# Patient Record
Sex: FEMALE | Race: BLACK OR AFRICAN AMERICAN | NOT HISPANIC OR LATINO | Employment: PART TIME | URBAN - METROPOLITAN AREA
[De-identification: names, ages, dates, MRNs, and addresses within clinical notes are randomized per-mention and may not be internally consistent; named-entity substitution may affect disease eponyms.]

---

## 2023-09-29 ENCOUNTER — LAB (OUTPATIENT)
Dept: LAB | Facility: LAB | Age: 23
End: 2023-09-29
Payer: COMMERCIAL

## 2023-09-29 LAB
CHLAMYDIA TRACH., AMPLIFIED: NEGATIVE
HEPATITIS B VIRUS SURFACE AG PRESENCE IN SERUM: NONREACTIVE
HEPATITIS C VIRUS AB PRESENCE IN SERUM: NONREACTIVE
HIV 1/ 2 AG/AB SCREEN: NONREACTIVE
N. GONORRHEA, AMPLIFIED: NEGATIVE
SYPHILIS TOTAL AB: NONREACTIVE
TRICHOMONAS VAGINALIS: NEGATIVE

## 2023-10-02 DIAGNOSIS — B96.89 BACTERIAL VAGINOSIS: Primary | ICD-10-CM

## 2023-10-02 DIAGNOSIS — N76.0 BACTERIAL VAGINOSIS: Primary | ICD-10-CM

## 2023-10-02 LAB
CLUE CELLS: PRESENT
NUGENT SCORE: 7
YEAST: ABNORMAL

## 2023-10-04 ENCOUNTER — TELEPHONE (OUTPATIENT)
Dept: OBSTETRICS AND GYNECOLOGY | Facility: HOSPITAL | Age: 23
End: 2023-10-04
Payer: COMMERCIAL

## 2023-10-04 RX ORDER — METRONIDAZOLE 500 MG/1
500 TABLET ORAL 2 TIMES DAILY
Qty: 14 TABLET | Refills: 0 | Status: SHIPPED | OUTPATIENT
Start: 2023-10-04 | End: 2023-10-11

## 2023-10-04 NOTE — PROGRESS NOTES
RICH score 7    Positive for Clue Cells.    Metronidazole 500mg twice a day for 7 days sent to pharmacy.    PHAN Carey-ANGELA

## 2024-02-09 ENCOUNTER — LAB (OUTPATIENT)
Dept: LAB | Facility: LAB | Age: 24
End: 2024-02-09
Payer: COMMERCIAL

## 2024-02-09 ENCOUNTER — INITIAL PRENATAL (OUTPATIENT)
Dept: OBSTETRICS AND GYNECOLOGY | Facility: HOSPITAL | Age: 24
End: 2024-02-09
Payer: COMMERCIAL

## 2024-02-09 VITALS — BODY MASS INDEX: 45.68 KG/M2 | DIASTOLIC BLOOD PRESSURE: 73 MMHG | SYSTOLIC BLOOD PRESSURE: 114 MMHG | WEIGHT: 283 LBS

## 2024-02-09 DIAGNOSIS — Z34.01 ENCOUNTER FOR SUPERVISION OF NORMAL FIRST PREGNANCY IN FIRST TRIMESTER (HHS-HCC): Primary | ICD-10-CM

## 2024-02-09 DIAGNOSIS — Z34.01 ENCOUNTER FOR SUPERVISION OF NORMAL FIRST PREGNANCY IN FIRST TRIMESTER (HHS-HCC): ICD-10-CM

## 2024-02-09 DIAGNOSIS — O99.211 OBESITY AFFECTING PREGNANCY IN FIRST TRIMESTER, UNSPECIFIED OBESITY TYPE (HHS-HCC): ICD-10-CM

## 2024-02-09 PROBLEM — F32.A DEPRESSION DURING PREGNANCY IN FIRST TRIMESTER (HHS-HCC): Status: ACTIVE | Noted: 2024-02-09

## 2024-02-09 PROBLEM — O99.341 DEPRESSION DURING PREGNANCY IN FIRST TRIMESTER (HHS-HCC): Status: ACTIVE | Noted: 2024-02-09

## 2024-02-09 LAB
ABO GROUP (TYPE) IN BLOOD: NORMAL
ANTIBODY SCREEN: NORMAL
ERYTHROCYTE [DISTWIDTH] IN BLOOD BY AUTOMATED COUNT: 14.5 % (ref 11.5–14.5)
EST. AVERAGE GLUCOSE BLD GHB EST-MCNC: 97 MG/DL
HBA1C MFR BLD: 5 %
HBV SURFACE AG SERPL QL IA: NONREACTIVE
HCT VFR BLD AUTO: 40.3 % (ref 36–46)
HCV AB SER QL: NONREACTIVE
HGB BLD-MCNC: 13.8 G/DL (ref 12–16)
HIV 1+2 AB+HIV1 P24 AG SERPL QL IA: NONREACTIVE
MCH RBC QN AUTO: 30.3 PG (ref 26–34)
MCHC RBC AUTO-ENTMCNC: 34.2 G/DL (ref 32–36)
MCV RBC AUTO: 88 FL (ref 80–100)
NRBC BLD-RTO: 0 /100 WBCS (ref 0–0)
PLATELET # BLD AUTO: 284 X10*3/UL (ref 150–450)
RBC # BLD AUTO: 4.56 X10*6/UL (ref 4–5.2)
REFLEX ADDED, ANEMIA PANEL: NORMAL
RH FACTOR (ANTIGEN D): NORMAL
RUBV IGG SERPL IA-ACNC: 1.4 IA
RUBV IGG SERPL QL IA: POSITIVE
TREPONEMA PALLIDUM IGG+IGM AB [PRESENCE] IN SERUM OR PLASMA BY IMMUNOASSAY: NONREACTIVE
WBC # BLD AUTO: 10.1 X10*3/UL (ref 4.4–11.3)

## 2024-02-09 PROCEDURE — 86803 HEPATITIS C AB TEST: CPT

## 2024-02-09 PROCEDURE — 99204 OFFICE O/P NEW MOD 45 MIN: CPT | Performed by: OBSTETRICS & GYNECOLOGY

## 2024-02-09 PROCEDURE — 99214 OFFICE O/P EST MOD 30 MIN: CPT | Performed by: OBSTETRICS & GYNECOLOGY

## 2024-02-09 PROCEDURE — 85027 COMPLETE CBC AUTOMATED: CPT

## 2024-02-09 PROCEDURE — 83020 HEMOGLOBIN ELECTROPHORESIS: CPT | Performed by: OBSTETRICS & GYNECOLOGY

## 2024-02-09 PROCEDURE — 87340 HEPATITIS B SURFACE AG IA: CPT

## 2024-02-09 PROCEDURE — 87389 HIV-1 AG W/HIV-1&-2 AB AG IA: CPT

## 2024-02-09 PROCEDURE — 86850 RBC ANTIBODY SCREEN: CPT

## 2024-02-09 PROCEDURE — 83021 HEMOGLOBIN CHROMOTOGRAPHY: CPT

## 2024-02-09 PROCEDURE — 87661 TRICHOMONAS VAGINALIS AMPLIF: CPT | Mod: 59 | Performed by: OBSTETRICS & GYNECOLOGY

## 2024-02-09 PROCEDURE — 86780 TREPONEMA PALLIDUM: CPT

## 2024-02-09 PROCEDURE — 87086 URINE CULTURE/COLONY COUNT: CPT | Performed by: OBSTETRICS & GYNECOLOGY

## 2024-02-09 PROCEDURE — 83036 HEMOGLOBIN GLYCOSYLATED A1C: CPT

## 2024-02-09 PROCEDURE — 86901 BLOOD TYPING SEROLOGIC RH(D): CPT

## 2024-02-09 PROCEDURE — 86900 BLOOD TYPING SEROLOGIC ABO: CPT

## 2024-02-09 PROCEDURE — 86317 IMMUNOASSAY INFECTIOUS AGENT: CPT

## 2024-02-09 PROCEDURE — 36415 COLL VENOUS BLD VENIPUNCTURE: CPT

## 2024-02-09 PROCEDURE — 87800 DETECT AGNT MULT DNA DIREC: CPT | Performed by: OBSTETRICS & GYNECOLOGY

## 2024-02-09 RX ORDER — ASPIRIN 81 MG/1
162 TABLET ORAL DAILY
Qty: 60 TABLET | Refills: 11 | Status: SHIPPED | OUTPATIENT
Start: 2024-02-09 | End: 2025-02-08

## 2024-02-09 RX ORDER — B-COMPLEX WITH VITAMIN C
1 TABLET ORAL DAILY
Qty: 90 TABLET | Refills: 3 | Status: SHIPPED | OUTPATIENT
Start: 2024-02-09

## 2024-02-09 ASSESSMENT — ENCOUNTER SYMPTOMS
COUGH: 0
VOMITING: 0
DIZZINESS: 0
SHORTNESS OF BREATH: 0
DIARRHEA: 0
CHILLS: 0
FREQUENCY: 0
WEAKNESS: 0
CONSTIPATION: 0
HEMATURIA: 0
FEVER: 0
PALPITATIONS: 0
HEADACHES: 0
NAUSEA: 1
DYSURIA: 0
ABDOMINAL PAIN: 0

## 2024-02-09 ASSESSMENT — EDINBURGH POSTNATAL DEPRESSION SCALE (EPDS)
I HAVE BLAMED MYSELF UNNECESSARILY WHEN THINGS WENT WRONG: YES, SOME OF THE TIME
THINGS HAVE BEEN GETTING ON TOP OF ME: YES, SOMETIMES I HAVEN'T BEEN COPING AS WELL AS USUAL
TOTAL SCORE: 17
I HAVE BEEN SO UNHAPPY THAT I HAVE HAD DIFFICULTY SLEEPING: YES, SOMETIMES
I HAVE BEEN ANXIOUS OR WORRIED FOR NO GOOD REASON: YES, SOMETIMES
I HAVE BEEN SO UNHAPPY THAT I HAVE BEEN CRYING: YES, MOST OF THE TIME
THE THOUGHT OF HARMING MYSELF HAS OCCURRED TO ME: NEVER
I HAVE FELT SAD OR MISERABLE: YES, MOST OF THE TIME
I HAVE FELT SCARED OR PANICKY FOR NO GOOD REASON: NO, NOT MUCH
I HAVE BEEN ABLE TO LAUGH AND SEE THE FUNNY SIDE OF THINGS: NOT QUITE SO MUCH NOW
I HAVE LOOKED FORWARD WITH ENJOYMENT TO THINGS: RATHER LESS THAN I USED TO

## 2024-02-09 NOTE — PROGRESS NOTES
Initial Prenatal Visit    SUBJECTIVE  Sera Mehta is a 23 y.o.  at 13w4d with an estimated date of delivery of 2024, by Ultrasound who presents for an initial prenatal visit.    Doing well. Nausea and vomiting of pregnancy improving, not every day. Bleeding around 6 weeks, none since.     OB/GYN History  OB History    Para Term  AB Living   1             SAB IAB Ectopic Multiple Live Births                  # Outcome Date GA Lbr Oziel/2nd Weight Sex Delivery Anes PTL Lv   1 Current               No LMP recorded. Patient is pregnant.    Prior pregnancy complications: None    The following portions of the chart were reviewed this encounter and updated as appropriate:    Tobacco  Allergies  Meds  Problems  Med Hx  Surg Hx  Fam Hx         Review of Systems  Review of Systems   Constitutional:  Negative for chills and fever.   Eyes:  Negative for visual disturbance.   Respiratory:  Negative for cough and shortness of breath.    Cardiovascular:  Negative for chest pain and palpitations.   Gastrointestinal:  Positive for nausea. Negative for abdominal pain, constipation, diarrhea and vomiting.   Genitourinary:  Negative for dyspareunia, dysuria, frequency, hematuria, urgency, vaginal bleeding and vaginal discharge.   Neurological:  Negative for dizziness, weakness and headaches.        OBJECTIVE  Vitals:    24 1510   BP: 114/73   Weight: 128 kg (283 lb)          Expected Total Weight Gain: 5 kg (11 lb)-9 kg (19 lb)  Pregravid BMI: 49.57    Physical Exam  See prenatal physical exam tab    ASSESSMENT & PLAN    Obesity complicating pregnancy in first trimester  - Plan for 34 week  testing and serial growths    Depression during pregnancy in first trimester  - Denies thoughts of harm  - Denies needs for medication or referral for counseling    Routine care  -Dated by 6 week US  -Initial prenatal labs today, STI screening, Pap UTD  -OB education provided.  -Recommended vaccines  (COVID-19, flu).  -Clinical risk assessment for preeclampsia: 2 minor, low-dose aspirin Rx    Genetic counseling  -Patient was counseled regarding risks and benefits of serum screening for genetic disorders. After discussion, patient desires carrier screening panel  -Patient was also counseled about options for Down's syndrome and other aneuploidy screening. After discussion, patient desires cffDNA  -Advised anatomy ultrasound at 18-20 weeks.    Pregnancy: Morales     Delivery Plans  Planned delivery method: Vaginal  Planned delivery location: [facility]  Planned anesthesia: None  Acceptable blood products: All     Post-Delivery Plans  Feeding intentions: Breast Milk  Planned birth control: OCP      Follow up in 4 weeks for return OB visit.    Lucy Mcgee MD  Obstetrics & Gynecology  02/09/24

## 2024-02-10 LAB
BACTERIA UR CULT: NORMAL
C TRACH RRNA SPEC QL NAA+PROBE: NEGATIVE
N GONORRHOEA DNA SPEC QL PROBE+SIG AMP: NEGATIVE
T VAGINALIS RRNA SPEC QL NAA+PROBE: NEGATIVE

## 2024-02-13 LAB
HEMOGLOBIN A2: 3.1 % (ref 2–3.5)
HEMOGLOBIN A: 95.7 % (ref 95.8–98)
HEMOGLOBIN F: 1.2 % (ref 0–2)
HEMOGLOBIN IDENTIFICATION INTERPRETATION: NORMAL
PATH REVIEW-HGB IDENTIFICATION: ABNORMAL

## 2024-02-14 ENCOUNTER — HOSPITAL ENCOUNTER (OUTPATIENT)
Dept: RADIOLOGY | Facility: HOSPITAL | Age: 24
Discharge: HOME | End: 2024-02-14
Payer: COMMERCIAL

## 2024-02-14 DIAGNOSIS — Z34.01 ENCOUNTER FOR SUPERVISION OF NORMAL FIRST PREGNANCY IN FIRST TRIMESTER (HHS-HCC): ICD-10-CM

## 2024-02-14 PROCEDURE — 76817 TRANSVAGINAL US OBSTETRIC: CPT | Performed by: STUDENT IN AN ORGANIZED HEALTH CARE EDUCATION/TRAINING PROGRAM

## 2024-02-14 PROCEDURE — 76817 TRANSVAGINAL US OBSTETRIC: CPT

## 2024-02-14 PROCEDURE — 76815 OB US LIMITED FETUS(S): CPT | Performed by: STUDENT IN AN ORGANIZED HEALTH CARE EDUCATION/TRAINING PROGRAM

## 2024-02-14 PROCEDURE — 76815 OB US LIMITED FETUS(S): CPT

## 2024-02-27 LAB — SCAN RESULT: NORMAL

## 2024-03-12 ENCOUNTER — ROUTINE PRENATAL (OUTPATIENT)
Dept: OBSTETRICS AND GYNECOLOGY | Facility: HOSPITAL | Age: 24
End: 2024-03-12
Payer: COMMERCIAL

## 2024-03-12 VITALS — BODY MASS INDEX: 46.97 KG/M2 | SYSTOLIC BLOOD PRESSURE: 113 MMHG | DIASTOLIC BLOOD PRESSURE: 78 MMHG | WEIGHT: 291 LBS

## 2024-03-12 DIAGNOSIS — O99.211 OBESITY AFFECTING PREGNANCY IN FIRST TRIMESTER, UNSPECIFIED OBESITY TYPE (HHS-HCC): ICD-10-CM

## 2024-03-12 DIAGNOSIS — O99.341 DEPRESSION DURING PREGNANCY IN FIRST TRIMESTER (HHS-HCC): ICD-10-CM

## 2024-03-12 DIAGNOSIS — F32.A DEPRESSION DURING PREGNANCY IN FIRST TRIMESTER (HHS-HCC): ICD-10-CM

## 2024-03-12 DIAGNOSIS — Z34.01 ENCOUNTER FOR SUPERVISION OF NORMAL FIRST PREGNANCY IN FIRST TRIMESTER (HHS-HCC): ICD-10-CM

## 2024-03-12 PROCEDURE — 99213 OFFICE O/P EST LOW 20 MIN: CPT | Performed by: OBSTETRICS & GYNECOLOGY

## 2024-03-12 PROCEDURE — 99213 OFFICE O/P EST LOW 20 MIN: CPT | Mod: TH | Performed by: OBSTETRICS & GYNECOLOGY

## 2024-03-12 ASSESSMENT — ENCOUNTER SYMPTOMS
LOSS OF SENSATION IN FEET: 0
OCCASIONAL FEELINGS OF UNSTEADINESS: 0

## 2024-03-12 NOTE — PROGRESS NOTES
SUBJECTIVE  Sera Mehta is a 23 y.o.  at 18w1d with an estimated date of delivery of 2024, by Ultrasound who presents for a routine prenatal visit.    She denies loss of fluid, vaginal bleeding, regular contractions/cramping. Not yet feeling fetal movement. Rare nausea and vomiting.     OBJECTIVE  Vitals:    24 1510   BP: 113/78   Weight: 132 kg (291 lb)          ASSESSMENT & PLAN    Encounter for supervision of normal first pregnancy in first trimester  - Up to date on care  - Anatomy scheduled    Obesity complicating pregnancy in first trimester  - Reviewed recommendation for bASA    Depression during pregnancy in first trimester  - Discussed depression, she says she is currently feeling good  - Reviewed precautions and risk of PP depression    Follow up in 4 weeks for return OB visit.    Lucy Mcgee MD  Obstetrics & Gynecology  24

## 2024-03-12 NOTE — ASSESSMENT & PLAN NOTE
- Discussed depression, she says she is currently feeling good  - Reviewed precautions and risk of PP depression

## 2024-03-28 ENCOUNTER — HOSPITAL ENCOUNTER (OUTPATIENT)
Dept: RADIOLOGY | Facility: HOSPITAL | Age: 24
Discharge: HOME | End: 2024-03-28
Payer: COMMERCIAL

## 2024-03-28 DIAGNOSIS — Z34.01 ENCOUNTER FOR SUPERVISION OF NORMAL FIRST PREGNANCY IN FIRST TRIMESTER (HHS-HCC): ICD-10-CM

## 2024-03-28 PROCEDURE — 76811 OB US DETAILED SNGL FETUS: CPT | Performed by: MEDICAL GENETICS

## 2024-03-28 PROCEDURE — 76811 OB US DETAILED SNGL FETUS: CPT

## 2024-04-10 ENCOUNTER — HOSPITAL ENCOUNTER (OUTPATIENT)
Dept: RADIOLOGY | Facility: HOSPITAL | Age: 24
Discharge: HOME | End: 2024-04-10
Payer: COMMERCIAL

## 2024-04-10 DIAGNOSIS — O99.212 OBESITY AFFECTING PREGNANCY IN SECOND TRIMESTER (HHS-HCC): ICD-10-CM

## 2024-04-10 DIAGNOSIS — Z36.2 ENCOUNTER FOR FOLLOW-UP ULTRASOUND OF FETAL ANATOMY (HHS-HCC): ICD-10-CM

## 2024-04-10 DIAGNOSIS — Z34.01 ENCOUNTER FOR SUPERVISION OF NORMAL FIRST PREGNANCY IN FIRST TRIMESTER (HHS-HCC): ICD-10-CM

## 2024-04-10 PROCEDURE — 76816 OB US FOLLOW-UP PER FETUS: CPT | Performed by: OBSTETRICS & GYNECOLOGY

## 2024-04-10 PROCEDURE — 76816 OB US FOLLOW-UP PER FETUS: CPT

## 2024-04-16 ENCOUNTER — TELEPHONE (OUTPATIENT)
Dept: OBSTETRICS AND GYNECOLOGY | Facility: HOSPITAL | Age: 24
End: 2024-04-16

## 2024-04-16 ENCOUNTER — ROUTINE PRENATAL (OUTPATIENT)
Dept: OBSTETRICS AND GYNECOLOGY | Facility: HOSPITAL | Age: 24
End: 2024-04-16
Payer: COMMERCIAL

## 2024-04-16 VITALS — DIASTOLIC BLOOD PRESSURE: 76 MMHG | SYSTOLIC BLOOD PRESSURE: 121 MMHG | WEIGHT: 293 LBS | BODY MASS INDEX: 48.26 KG/M2

## 2024-04-16 DIAGNOSIS — Z34.01 ENCOUNTER FOR SUPERVISION OF NORMAL FIRST PREGNANCY IN FIRST TRIMESTER (HHS-HCC): Primary | ICD-10-CM

## 2024-04-16 DIAGNOSIS — N89.8 VAGINAL IRRITATION: ICD-10-CM

## 2024-04-16 PROBLEM — O99.891 BACK PAIN AFFECTING PREGNANCY IN SECOND TRIMESTER (HHS-HCC): Status: ACTIVE | Noted: 2024-04-16

## 2024-04-16 PROBLEM — O12.02: Status: ACTIVE | Noted: 2024-04-16

## 2024-04-16 PROBLEM — M54.9 BACK PAIN AFFECTING PREGNANCY IN SECOND TRIMESTER (HHS-HCC): Status: ACTIVE | Noted: 2024-04-16

## 2024-04-16 PROCEDURE — 99213 OFFICE O/P EST LOW 20 MIN: CPT | Performed by: OBSTETRICS & GYNECOLOGY

## 2024-04-16 PROCEDURE — 87205 SMEAR GRAM STAIN: CPT | Performed by: OBSTETRICS & GYNECOLOGY

## 2024-04-16 PROCEDURE — 87800 DETECT AGNT MULT DNA DIREC: CPT | Performed by: OBSTETRICS & GYNECOLOGY

## 2024-04-16 PROCEDURE — 87661 TRICHOMONAS VAGINALIS AMPLIF: CPT | Mod: 59 | Performed by: OBSTETRICS & GYNECOLOGY

## 2024-04-16 PROCEDURE — 99213 OFFICE O/P EST LOW 20 MIN: CPT | Mod: TH | Performed by: OBSTETRICS & GYNECOLOGY

## 2024-04-16 ASSESSMENT — ENCOUNTER SYMPTOMS
LOSS OF SENSATION IN FEET: 0
OCCASIONAL FEELINGS OF UNSTEADINESS: 0
DEPRESSION: 0

## 2024-04-16 NOTE — TELEPHONE ENCOUNTER
Support belt and compression stocking orer form completed signed and faxed to [facility].  MITZY Monzon-RN      ----- Message from Lucy Mcgee MD sent at 4/16/2024  1:38 PM EDT -----  Hi!     Can we request pregnancy support belt and compression stockings for Aleecia?    Thanks!

## 2024-04-16 NOTE — PROGRESS NOTES
SUBJECTIVE  Sera Mehta is a 23 y.o.  at 23w1d with an estimated date of delivery of 2024, by Ultrasound who presents for a routine prenatal visit.    She denies loss of fluid, vaginal bleeding, regular contractions/cramping, and decreased fetal movement. Some irritation with intercourse, denies vaginal discharge.     OBJECTIVE  Vitals:    24 1318   BP: 121/76   Weight: 136 kg (299 lb)          ASSESSMENT & PLAN    Obesity complicating pregnancy in first trimester ([facility])  - Plan for 34 week  testing and serial growths    Encounter for supervision of normal first pregnancy in first trimester ([facility])  - Discussed OGTT and CBC next visit    Vaginal irritation  - GC/CT/TV, BV/yeast today    Swelling of lower extremity during pregnancy in second trimester ([facility])  - Compression socks requested    Back pain affecting pregnancy in second trimester ([facility])  - Pregnancy support belt requested     Follow up in 3 weeks for return OB visit.    Lucy Mcgee MD  Obstetrics & Gynecology  24

## 2024-04-16 NOTE — PATIENT INSTRUCTIONS
Which medications are safe for pregnant people?  Cold medications that are generally considered safe for pregnant people include:    Acetaminophen (Tylenol)  Some antihistamines, including loratadine (Claritin) and diphenhydramine (Benadryl)   Most steroid-based nasal sprays   Some cough medications, including expectorants, cough suppressants and most cough drops   Always talk to your doctor before starting a new medication, whether it's prescription or OTC. “If you have any concerns about your medication, don’t hesitate to call your doctor,” said Dr. Lopez. “They can help you determine what’s safe for you and your baby.”     Which medications should I avoid while pregnant?  Cold medications that are generally considered off-limits during pregnancy include:     Some pain relievers and fever reducers, including ibuprofen, aspirin and naproxen (Aleve)  Most decongestants, including Claritin-D, DayQuil or Sudafed   Non-steroidal nasal sprays, such as Afrin   Always read medication labels carefully and avoid alternative or homeopathic remedies, such as echinacea, vitamins, supplements and herbal remedies, until you have approval from your doctor.

## 2024-04-17 LAB
C TRACH RRNA SPEC QL NAA+PROBE: NEGATIVE
CLUE CELLS VAG LPF-#/AREA: NORMAL /[LPF]
N GONORRHOEA DNA SPEC QL PROBE+SIG AMP: NEGATIVE
NUGENT SCORE: 2
T VAGINALIS RRNA SPEC QL NAA+PROBE: NEGATIVE
YEAST VAG WET PREP-#/AREA: NORMAL

## 2024-05-07 ENCOUNTER — ROUTINE PRENATAL (OUTPATIENT)
Dept: OBSTETRICS AND GYNECOLOGY | Facility: HOSPITAL | Age: 24
End: 2024-05-07
Payer: COMMERCIAL

## 2024-05-07 ENCOUNTER — LAB (OUTPATIENT)
Dept: LAB | Facility: LAB | Age: 24
End: 2024-05-07
Payer: COMMERCIAL

## 2024-05-07 VITALS — SYSTOLIC BLOOD PRESSURE: 115 MMHG | DIASTOLIC BLOOD PRESSURE: 77 MMHG | WEIGHT: 293 LBS | BODY MASS INDEX: 48.74 KG/M2

## 2024-05-07 DIAGNOSIS — Z3A.26 26 WEEKS GESTATION OF PREGNANCY (HHS-HCC): ICD-10-CM

## 2024-05-07 DIAGNOSIS — O46.92 VAGINAL BLEEDING IN PREGNANCY, SECOND TRIMESTER (HHS-HCC): Primary | ICD-10-CM

## 2024-05-07 LAB
ERYTHROCYTE [DISTWIDTH] IN BLOOD BY AUTOMATED COUNT: 13.8 % (ref 11.5–14.5)
GLUCOSE 1H P 50 G GLC PO SERPL-MCNC: 96 MG/DL
HCT VFR BLD AUTO: 34.3 % (ref 36–46)
HGB BLD-MCNC: 11.6 G/DL (ref 12–16)
MCH RBC QN AUTO: 30 PG (ref 26–34)
MCHC RBC AUTO-ENTMCNC: 33.8 G/DL (ref 32–36)
MCV RBC AUTO: 89 FL (ref 80–100)
NRBC BLD-RTO: 0 /100 WBCS (ref 0–0)
PLATELET # BLD AUTO: 280 X10*3/UL (ref 150–450)
RBC # BLD AUTO: 3.87 X10*6/UL (ref 4–5.2)
REFLEX ADDED, ANEMIA PANEL: NORMAL
TREPONEMA PALLIDUM IGG+IGM AB [PRESENCE] IN SERUM OR PLASMA BY IMMUNOASSAY: NONREACTIVE
WBC # BLD AUTO: 10.9 X10*3/UL (ref 4.4–11.3)

## 2024-05-07 PROCEDURE — 82947 ASSAY GLUCOSE BLOOD QUANT: CPT

## 2024-05-07 PROCEDURE — 87205 SMEAR GRAM STAIN: CPT | Performed by: STUDENT IN AN ORGANIZED HEALTH CARE EDUCATION/TRAINING PROGRAM

## 2024-05-07 PROCEDURE — 87661 TRICHOMONAS VAGINALIS AMPLIF: CPT | Mod: 59 | Performed by: STUDENT IN AN ORGANIZED HEALTH CARE EDUCATION/TRAINING PROGRAM

## 2024-05-07 PROCEDURE — 36415 COLL VENOUS BLD VENIPUNCTURE: CPT

## 2024-05-07 PROCEDURE — 87800 DETECT AGNT MULT DNA DIREC: CPT | Performed by: STUDENT IN AN ORGANIZED HEALTH CARE EDUCATION/TRAINING PROGRAM

## 2024-05-07 PROCEDURE — 86780 TREPONEMA PALLIDUM: CPT

## 2024-05-07 PROCEDURE — 99214 OFFICE O/P EST MOD 30 MIN: CPT | Mod: TH | Performed by: STUDENT IN AN ORGANIZED HEALTH CARE EDUCATION/TRAINING PROGRAM

## 2024-05-07 PROCEDURE — 99214 OFFICE O/P EST MOD 30 MIN: CPT | Performed by: STUDENT IN AN ORGANIZED HEALTH CARE EDUCATION/TRAINING PROGRAM

## 2024-05-07 PROCEDURE — 85027 COMPLETE CBC AUTOMATED: CPT

## 2024-05-07 ASSESSMENT — ENCOUNTER SYMPTOMS
OCCASIONAL FEELINGS OF UNSTEADINESS: 0
LOSS OF SENSATION IN FEET: 0
DEPRESSION: 0

## 2024-05-07 NOTE — PROGRESS NOTES
Subjective   Patient ID [id number]   Sera Mehta is a 23 y.o.  at 26w1d with a working estimated date of delivery of 2024, by Ultrasound who presents for a routine prenatal visit. She denies leakage of fluid, decreased fetal movements, or contractions however reports spotting yesterday.    Her pregnancy is complicated by:  - spotting as above  - prepregnancy bmi 49  -     Objective   Physical Exam  Weight: 137 kg (302 lb)  Expected Total Weight Gain: 5 kg (11 lb)-9 kg (19 lb)   Pregravid BMI: 49.57  BP: 115/77  Fetal Heart Rate: 135      On speculum exam, friable cervix with green-tinged discharge is noted  Cervix closed    Assessment/Plan   Vaginal bleeding in pregnancy, second trimester ([facility])  - given cervical friability as well as vaginal discharge, will obtain GC/CT, trich, vaginitis panel, though low suspicion for cervicitis    26 weeks gestation of pregnancy ([facility])  - GTT today     Follow up in 2 weeks for a routine prenatal visit.    Kerri Mullen MD

## 2024-05-08 LAB
C TRACH RRNA SPEC QL NAA+PROBE: NEGATIVE
CLUE CELLS VAG LPF-#/AREA: NORMAL /[LPF]
N GONORRHOEA DNA SPEC QL PROBE+SIG AMP: NEGATIVE
NUGENT SCORE: 3
T VAGINALIS RRNA SPEC QL NAA+PROBE: NEGATIVE
YEAST VAG WET PREP-#/AREA: NORMAL

## 2024-05-15 ENCOUNTER — TELEPHONE (OUTPATIENT)
Dept: OBSTETRICS AND GYNECOLOGY | Facility: HOSPITAL | Age: 24
End: 2024-05-15
Payer: COMMERCIAL

## 2024-05-15 PROBLEM — O99.342 DEPRESSION DURING PREGNANCY IN SECOND TRIMESTER (HHS-HCC): Status: ACTIVE | Noted: 2024-02-09

## 2024-05-15 NOTE — ASSESSMENT & PLAN NOTE
- given cervical friability as well as vaginal discharge, will obtain GC/CT, trich, vaginitis panel, though low suspicion for cervicitis

## 2024-05-15 NOTE — TELEPHONE ENCOUNTER
RN called patient regarding Support Belt and Compression stocking.  Left a   Talia original request was sent on 4/16 request refaxed today 5/15  MITZY Monzon-SAM

## 2024-06-03 ENCOUNTER — HOSPITAL ENCOUNTER (OUTPATIENT)
Dept: RADIOLOGY | Facility: HOSPITAL | Age: 24
Discharge: HOME | End: 2024-06-03
Payer: COMMERCIAL

## 2024-06-03 ENCOUNTER — ROUTINE PRENATAL (OUTPATIENT)
Dept: OBSTETRICS AND GYNECOLOGY | Facility: HOSPITAL | Age: 24
End: 2024-06-03
Payer: COMMERCIAL

## 2024-06-03 ENCOUNTER — DOCUMENTATION (OUTPATIENT)
Dept: MATERNAL FETAL MEDICINE | Facility: HOSPITAL | Age: 24
End: 2024-06-03

## 2024-06-03 VITALS — BODY MASS INDEX: 47.78 KG/M2 | DIASTOLIC BLOOD PRESSURE: 69 MMHG | WEIGHT: 293 LBS | SYSTOLIC BLOOD PRESSURE: 100 MMHG

## 2024-06-03 DIAGNOSIS — Z3A.30 30 WEEKS GESTATION OF PREGNANCY (HHS-HCC): ICD-10-CM

## 2024-06-03 DIAGNOSIS — O99.213 OBESITY AFFECTING PREGNANCY IN THIRD TRIMESTER, UNSPECIFIED OBESITY TYPE (HHS-HCC): Primary | ICD-10-CM

## 2024-06-03 DIAGNOSIS — Z34.01 ENCOUNTER FOR SUPERVISION OF NORMAL FIRST PREGNANCY IN FIRST TRIMESTER (HHS-HCC): ICD-10-CM

## 2024-06-03 PROCEDURE — 76816 OB US FOLLOW-UP PER FETUS: CPT | Performed by: OBSTETRICS & GYNECOLOGY

## 2024-06-03 PROCEDURE — 90715 TDAP VACCINE 7 YRS/> IM: CPT | Performed by: OBSTETRICS & GYNECOLOGY

## 2024-06-03 PROCEDURE — 99213 OFFICE O/P EST LOW 20 MIN: CPT | Performed by: OBSTETRICS & GYNECOLOGY

## 2024-06-03 PROCEDURE — 76816 OB US FOLLOW-UP PER FETUS: CPT

## 2024-06-03 PROCEDURE — 76819 FETAL BIOPHYS PROFIL W/O NST: CPT | Performed by: OBSTETRICS & GYNECOLOGY

## 2024-06-03 PROCEDURE — 99213 OFFICE O/P EST LOW 20 MIN: CPT | Mod: TH | Performed by: OBSTETRICS & GYNECOLOGY

## 2024-06-03 PROCEDURE — 76819 FETAL BIOPHYS PROFIL W/O NST: CPT

## 2024-06-03 ASSESSMENT — ENCOUNTER SYMPTOMS
EYES NEGATIVE: 0
CONSTITUTIONAL NEGATIVE: 0
GASTROINTESTINAL NEGATIVE: 0
MUSCULOSKELETAL NEGATIVE: 0
CARDIOVASCULAR NEGATIVE: 0
PSYCHIATRIC NEGATIVE: 0
HEMATOLOGIC/LYMPHATIC NEGATIVE: 0
NEUROLOGICAL NEGATIVE: 0
RESPIRATORY NEGATIVE: 0
ENDOCRINE NEGATIVE: 0
ALLERGIC/IMMUNOLOGIC NEGATIVE: 0

## 2024-06-03 NOTE — PROGRESS NOTES
Subjective   Patient ID [id number]   Sera Mehta is a 23 y.o.  at 30w0d with a working estimated date of delivery of 2024, by Ultrasound who presents for a routine prenatal visit. She denies vaginal bleeding, leakage of fluid, decreased fetal movements, or contractions.  Asking about compression stockings, breast pump.     Her pregnancy is complicated by:  Problem List Items Addressed This Visit       Obesity complicating pregnancy, third trimester ([facility]) - Primary    Overview     - Plan for 34 week  testing and serial growths         30 weeks gestation of pregnancy ([facility])      Objective   Physical Exam  Weight: 134 kg (296 lb)  Expected Total Weight Gain: 5 kg (11 lb)-9 kg (19 lb)   Pregravid BMI: 49.57  BP: 100/69  Fetal Heart Rate: 136 Fundal Height (cm): 36 cm    Prenatal Labs  Urine dip:  Lab Results   Component Value Date    KETONESU NEGATIVE 10/23/2021       Lab Results   Component Value Date    HGB 11.6 (L) 2024    HCT 34.3 (L) 2024    ABO O 2024    HEPBSAG Nonreactive 2024     Imaging  4/10/24 Normal anatomy, Growth 54%ile.   Has Growth USN today.     Assessment/Plan   Problem List Items Addressed This Visit             ICD-10-CM    Obesity complicating pregnancy, third trimester ([facility]) - Primary O99.213    30 weeks gestation of pregnancy ([facility]) Z3A.30     Continue prenatal vitamin.  Labs reviewed- normal second trimester labs.   TdaP  Follow up in 2 weeks for a routine prenatal visit.

## 2024-06-03 NOTE — PROGRESS NOTES
RN met with the patient at the bedside after appt regarding. [employer] order.  Patient  states she received the Support brand but not a breast pump of Compressions socks  RmN explain socks were order on the same form with the support band and a breast pump has never been ordered.  Patient would like a breast pump ordered.  Called [employer] regarding compression sock  Left on hold for 26 min and call was disconnected.    MITZY Monzon-RN

## 2024-07-01 ENCOUNTER — APPOINTMENT (OUTPATIENT)
Dept: OBSTETRICS AND GYNECOLOGY | Facility: HOSPITAL | Age: 24
End: 2024-07-01
Payer: COMMERCIAL

## 2024-07-01 ENCOUNTER — PROCEDURE VISIT (OUTPATIENT)
Dept: OBSTETRICS AND GYNECOLOGY | Facility: HOSPITAL | Age: 24
End: 2024-07-01
Payer: COMMERCIAL

## 2024-07-01 VITALS — SYSTOLIC BLOOD PRESSURE: 114 MMHG | BODY MASS INDEX: 49.07 KG/M2 | WEIGHT: 293 LBS | DIASTOLIC BLOOD PRESSURE: 76 MMHG

## 2024-07-01 DIAGNOSIS — O99.342 DEPRESSION DURING PREGNANCY IN SECOND TRIMESTER (HHS-HCC): ICD-10-CM

## 2024-07-01 DIAGNOSIS — F32.A DEPRESSION DURING PREGNANCY IN SECOND TRIMESTER (HHS-HCC): ICD-10-CM

## 2024-07-01 DIAGNOSIS — O99.213 OBESITY AFFECTING PREGNANCY IN THIRD TRIMESTER, UNSPECIFIED OBESITY TYPE (HHS-HCC): ICD-10-CM

## 2024-07-01 DIAGNOSIS — O99.213 OBESITY AFFECTING PREGNANCY IN THIRD TRIMESTER, UNSPECIFIED OBESITY TYPE (HHS-HCC): Primary | ICD-10-CM

## 2024-07-01 PROCEDURE — 99212 OFFICE O/P EST SF 10 MIN: CPT

## 2024-07-01 PROCEDURE — 99212 OFFICE O/P EST SF 10 MIN: CPT | Mod: TH

## 2024-07-01 NOTE — PROGRESS NOTES
Subjective   Sera Mehta is a 23 y.o.  at 34w0d with a working estimated date of delivery of 2024, by Ultrasound who presents for a routine prenatal visit.     She denies vaginal bleeding, leakage of fluid, decreased fetal movements, or contractions.    Objective   Physical Exam:   Weight: 138 kg (304 lb)  TWG: -1.361 kg (-3 lb)  BP: 114/76  Fetal Heart Rate: NST Fundal Height (cm): 39 cm Presentation: Vertex         Postpartum Depression: High Risk (2024)    [place]  Depression Scale     Last EPDS Total Score: 17     Last EPDS Self Harm Result: Never        Prenatal Labs  Lab Results   Component Value Date    HGB 11.6 (L) 2024    HCT 34.3 (L) 2024     2024    SYPHT Nonreactive 2024     Lab Results   Component Value Date    GLUC1P 96 2024     Assessment/Plan   Problem List Items Addressed This Visit       Obesity complicating pregnancy, third trimester ([facility]) - Primary    Overview     - Plan for 34 week  testing and serial growths         Depression during pregnancy in second trimester ([facility])    Overview     - EPDS 17          Discussed routine GBS screening, to be completed next visit   surveillance weekly for BMI  Reviewed s/sx of PTL, warning signs, fetal movement counts, and when to call provider  Follow up in 2 weeks for a routine prenatal visit.    LATASHA Carey

## 2024-07-01 NOTE — PROGRESS NOTES
Sera Mehta, a  at 34w0d with an JESSY of 2024, by Ultrasound, was seen at [facility] for a nonstress test for obesity complicating pregnancy. NST reactive, per Nidhi Corbin. Patient discharged ambulatory. Reviewed pregnancy warning signs, discussed if she notices decreased FM, headaches unresolved by Tylenol, uterine cramping, leakage of vaginal fluid, or vaginal bleeding to present to OB triage for further evaluation. The patient verbalized understanding.     Alisa Paniagua MSN, RN, CLC

## 2024-07-08 ENCOUNTER — PROCEDURE VISIT (OUTPATIENT)
Dept: OBSTETRICS AND GYNECOLOGY | Facility: HOSPITAL | Age: 24
End: 2024-07-08
Payer: COMMERCIAL

## 2024-07-08 VITALS — SYSTOLIC BLOOD PRESSURE: 116 MMHG | DIASTOLIC BLOOD PRESSURE: 77 MMHG | BODY MASS INDEX: 49.87 KG/M2 | WEIGHT: 293 LBS

## 2024-07-08 DIAGNOSIS — O99.891 BACK PAIN AFFECTING PREGNANCY IN SECOND TRIMESTER (HHS-HCC): ICD-10-CM

## 2024-07-08 DIAGNOSIS — F32.A DEPRESSION DURING PREGNANCY IN SECOND TRIMESTER (HHS-HCC): ICD-10-CM

## 2024-07-08 DIAGNOSIS — M54.9 BACK PAIN AFFECTING PREGNANCY IN SECOND TRIMESTER (HHS-HCC): ICD-10-CM

## 2024-07-08 DIAGNOSIS — O46.92 VAGINAL BLEEDING IN PREGNANCY, SECOND TRIMESTER (HHS-HCC): ICD-10-CM

## 2024-07-08 DIAGNOSIS — O99.342 DEPRESSION DURING PREGNANCY IN SECOND TRIMESTER (HHS-HCC): ICD-10-CM

## 2024-07-08 DIAGNOSIS — O12.02 SWELLING OF LOWER EXTREMITY DURING PREGNANCY IN SECOND TRIMESTER (HHS-HCC): ICD-10-CM

## 2024-07-08 DIAGNOSIS — Z3A.30 30 WEEKS GESTATION OF PREGNANCY (HHS-HCC): ICD-10-CM

## 2024-07-08 DIAGNOSIS — O99.213 OBESITY AFFECTING PREGNANCY IN THIRD TRIMESTER, UNSPECIFIED OBESITY TYPE (HHS-HCC): ICD-10-CM

## 2024-07-08 DIAGNOSIS — Z34.01 ENCOUNTER FOR SUPERVISION OF NORMAL FIRST PREGNANCY IN FIRST TRIMESTER (HHS-HCC): ICD-10-CM

## 2024-07-08 PROCEDURE — 59025 FETAL NON-STRESS TEST: CPT | Performed by: OBSTETRICS & GYNECOLOGY

## 2024-07-08 NOTE — PROCEDURES
Sera Mehta, a  at 35w0d with an JESSY of 2024, by Ultrasound, was seen at [facility] for a nonstress test.    Non-Stress Test   Baseline Fetal Heart Rate for Non-Stress Test: 130 BPM  Variability in Waveform for Non-Stress Test: Moderate  Accelerations in Non-Stress Test: Yes, greater than/equal to 15 bpm, lasting at least 15 seconds  Decelerations in Non-Stress Test: None  Contractions in Non-Stress Test: Not present  Acoustic Stimulator for Non-Stress Test: No  Interpretation of Non-Stress Test   Interpretation of Non-Stress Test: Reactive      Lucy Mcgee MD

## 2024-07-15 ENCOUNTER — ROUTINE PRENATAL (OUTPATIENT)
Dept: OBSTETRICS AND GYNECOLOGY | Facility: HOSPITAL | Age: 24
End: 2024-07-15
Payer: COMMERCIAL

## 2024-07-15 ENCOUNTER — HOSPITAL ENCOUNTER (OUTPATIENT)
Dept: RADIOLOGY | Facility: HOSPITAL | Age: 24
Discharge: HOME | End: 2024-07-15
Payer: COMMERCIAL

## 2024-07-15 VITALS — WEIGHT: 293 LBS | DIASTOLIC BLOOD PRESSURE: 76 MMHG | BODY MASS INDEX: 48.74 KG/M2 | SYSTOLIC BLOOD PRESSURE: 112 MMHG

## 2024-07-15 DIAGNOSIS — F32.A DEPRESSION DURING PREGNANCY IN SECOND TRIMESTER (HHS-HCC): ICD-10-CM

## 2024-07-15 DIAGNOSIS — O99.213 OBESITY AFFECTING PREGNANCY IN THIRD TRIMESTER, UNSPECIFIED OBESITY TYPE (HHS-HCC): ICD-10-CM

## 2024-07-15 DIAGNOSIS — O99.342 DEPRESSION DURING PREGNANCY IN SECOND TRIMESTER (HHS-HCC): ICD-10-CM

## 2024-07-15 DIAGNOSIS — R03.0 ELEVATED BLOOD PRESSURE READING: ICD-10-CM

## 2024-07-15 DIAGNOSIS — Z3A.36 36 WEEKS GESTATION OF PREGNANCY (HHS-HCC): Primary | ICD-10-CM

## 2024-07-15 DIAGNOSIS — Z34.01 ENCOUNTER FOR SUPERVISION OF NORMAL FIRST PREGNANCY IN FIRST TRIMESTER (HHS-HCC): ICD-10-CM

## 2024-07-15 PROCEDURE — 87081 CULTURE SCREEN ONLY: CPT

## 2024-07-15 PROCEDURE — 99213 OFFICE O/P EST LOW 20 MIN: CPT

## 2024-07-15 PROCEDURE — 76819 FETAL BIOPHYS PROFIL W/O NST: CPT | Performed by: OBSTETRICS & GYNECOLOGY

## 2024-07-15 PROCEDURE — 76816 OB US FOLLOW-UP PER FETUS: CPT | Performed by: OBSTETRICS & GYNECOLOGY

## 2024-07-15 PROCEDURE — 99213 OFFICE O/P EST LOW 20 MIN: CPT | Mod: TH,25

## 2024-07-15 PROCEDURE — 76819 FETAL BIOPHYS PROFIL W/O NST: CPT

## 2024-07-15 PROCEDURE — 76816 OB US FOLLOW-UP PER FETUS: CPT

## 2024-07-15 NOTE — PROGRESS NOTES
Subjective   Sera Mehta is a 23 y.o.  at 36w0d with a working estimated date of delivery of 2024, by Ultrasound who presents for a routine prenatal visit.     She denies vaginal bleeding, leakage of fluid, decreased fetal movements, or contractions.    Objective   Physical Exam:   Weight: 137 kg (302 lb)  TWG: -2.268 kg (-5 lb)  BP: 112/76  Fetal Heart Rate: US Fundal Height (cm): 42 cm Presentation: Vertex         Postpartum Depression: High Risk (2024)    [place]  Depression Scale     Last EPDS Total Score: 17     Last EPDS Self Harm Result: Never        Prenatal Labs  Lab Results   Component Value Date    HGB 11.6 (L) 2024    HCT 34.3 (L) 2024     2024    SYPHT Nonreactive 2024     Lab Results   Component Value Date    GLUC1P 96 2024     Imaging  The most recent ultrasound was performed on 7/15 with a study GA of 36.0wks and EFW 33%, AC 40%.     Assessment/Plan   Problem List Items Addressed This Visit       Obesity complicating pregnancy, third trimester ([facility])    Overview     - Plan for 34 week  testing and serial growths         Relevant Orders    Fetal nonstress test    Depression during pregnancy in second trimester ([facility])    Overview     - EPDS 17         Elevated blood pressure reading    Overview     128/93 with 112/76 recheck at the conclusion of the visit  HELLP labs ordered  Will continue to monitor         Relevant Orders    Group B Streptococcus (GBS) Prenatal Screen, Culture    Comprehensive Metabolic Panel    CBC Anemia Panel With Reflex,Pregnancy    Lactate Dehydrogenase    Protein, Urine Random     Other Visit Diagnoses       36 weeks gestation of pregnancy ([facility])    -  Primary          Discussed routine GBS screening, to be completed today   surveillance weekly for BMI  Reviewed s/sx of PTL, warning signs, fetal movement counts, and when to call provider  Follow up in 1 week for a routine prenatal  visit.    Nidhi Dyer, APRN-CNM

## 2024-07-17 LAB — GP B STREP GENITAL QL CULT: NORMAL

## 2024-07-25 ENCOUNTER — PROCEDURE VISIT (OUTPATIENT)
Dept: OBSTETRICS AND GYNECOLOGY | Facility: HOSPITAL | Age: 24
End: 2024-07-25
Payer: COMMERCIAL

## 2024-07-25 ENCOUNTER — ROUTINE PRENATAL (OUTPATIENT)
Dept: OBSTETRICS AND GYNECOLOGY | Facility: HOSPITAL | Age: 24
End: 2024-07-25
Payer: COMMERCIAL

## 2024-07-25 VITALS — SYSTOLIC BLOOD PRESSURE: 111 MMHG | WEIGHT: 293 LBS | BODY MASS INDEX: 49.55 KG/M2 | DIASTOLIC BLOOD PRESSURE: 77 MMHG

## 2024-07-25 DIAGNOSIS — O12.02 SWELLING OF LOWER EXTREMITY DURING PREGNANCY IN SECOND TRIMESTER (HHS-HCC): ICD-10-CM

## 2024-07-25 DIAGNOSIS — M54.9 BACK PAIN AFFECTING PREGNANCY IN SECOND TRIMESTER (HHS-HCC): ICD-10-CM

## 2024-07-25 DIAGNOSIS — O99.891 BACK PAIN AFFECTING PREGNANCY IN SECOND TRIMESTER (HHS-HCC): ICD-10-CM

## 2024-07-25 DIAGNOSIS — Z34.01 ENCOUNTER FOR SUPERVISION OF NORMAL FIRST PREGNANCY IN FIRST TRIMESTER (HHS-HCC): ICD-10-CM

## 2024-07-25 DIAGNOSIS — Z3A.30 30 WEEKS GESTATION OF PREGNANCY (HHS-HCC): ICD-10-CM

## 2024-07-25 DIAGNOSIS — O26.893 HEARTBURN DURING PREGNANCY IN THIRD TRIMESTER (HHS-HCC): Primary | ICD-10-CM

## 2024-07-25 DIAGNOSIS — O99.342 DEPRESSION DURING PREGNANCY IN SECOND TRIMESTER (HHS-HCC): ICD-10-CM

## 2024-07-25 DIAGNOSIS — O99.213 OBESITY AFFECTING PREGNANCY IN THIRD TRIMESTER, UNSPECIFIED OBESITY TYPE (HHS-HCC): Primary | ICD-10-CM

## 2024-07-25 DIAGNOSIS — F32.A DEPRESSION DURING PREGNANCY IN SECOND TRIMESTER (HHS-HCC): ICD-10-CM

## 2024-07-25 DIAGNOSIS — O46.92 VAGINAL BLEEDING IN PREGNANCY, SECOND TRIMESTER (HHS-HCC): ICD-10-CM

## 2024-07-25 DIAGNOSIS — O99.213 OBESITY AFFECTING PREGNANCY IN THIRD TRIMESTER, UNSPECIFIED OBESITY TYPE (HHS-HCC): ICD-10-CM

## 2024-07-25 DIAGNOSIS — R12 HEARTBURN DURING PREGNANCY IN THIRD TRIMESTER (HHS-HCC): Primary | ICD-10-CM

## 2024-07-25 PROCEDURE — 99213 OFFICE O/P EST LOW 20 MIN: CPT | Performed by: OBSTETRICS & GYNECOLOGY

## 2024-07-25 PROCEDURE — 59025 FETAL NON-STRESS TEST: CPT | Performed by: OBSTETRICS & GYNECOLOGY

## 2024-07-25 PROCEDURE — 99213 OFFICE O/P EST LOW 20 MIN: CPT | Mod: TH,25 | Performed by: OBSTETRICS & GYNECOLOGY

## 2024-07-25 RX ORDER — FAMOTIDINE 20 MG/1
20 TABLET, FILM COATED ORAL 2 TIMES DAILY
Qty: 90 TABLET | Refills: 3 | Status: ON HOLD | OUTPATIENT
Start: 2024-07-25 | End: 2025-01-21

## 2024-07-25 ASSESSMENT — ENCOUNTER SYMPTOMS
OCCASIONAL FEELINGS OF UNSTEADINESS: 0
DEPRESSION: 0
LOSS OF SENSATION IN FEET: 0

## 2024-07-25 NOTE — PROCEDURES
Sera Mehta, a  at 37w3d with an JESSY of 2024, by Ultrasound, was seen at [facility] for a nonstress test.    Non-Stress Test   Baseline Fetal Heart Rate for Non-Stress Test: 130 BPM  Variability in Waveform for Non-Stress Test: Moderate  Accelerations in Non-Stress Test: Yes, greater than/equal to 15 bpm, lasting at least 15 seconds  Decelerations in Non-Stress Test: None  Contractions in Non-Stress Test: Not present  Acoustic Stimulator for Non-Stress Test: No  Interpretation of Non-Stress Test   Interpretation of Non-Stress Test: Reactive      Lucy Mcgee MD

## 2024-07-25 NOTE — ASSESSMENT & PLAN NOTE
- Up to date date on care  - Would like to continue expectant management  - Reviewed precautions for labor and pre-eclampsia

## 2024-07-25 NOTE — PROGRESS NOTES
SUBJECTIVE  Sera Mehta is a 23 y.o.  at 37w3d with an estimated date of delivery of 2024, by Ultrasound who presents for a routine prenatal visit.    She denies loss of fluid, vaginal bleeding, regular contractions/cramping, and decreased fetal movement.     OBJECTIVE  Vitals:    24 0941   BP: 111/77   Weight: 139 kg (307 lb)          ASSESSMENT & PLAN    Encounter for supervision of normal first pregnancy in first trimester ([facility])  - Up to date date on care  - Would like to continue expectant management  - Reviewed precautions for labor and pre-eclampsia    Obesity complicating pregnancy, third trimester ([facility])  - Growth normal at 36 weeks  - Weekly NSTs    Heartburn during pregnancy in third trimester ([facility])  - Will Rx pepcid and TUMS    Follow up in 1 weeks for return OB visit.    Lucy Mcgee MD  Obstetrics & Gynecology  24

## 2024-07-26 ENCOUNTER — HOSPITAL ENCOUNTER (INPATIENT)
Facility: HOSPITAL | Age: 24
End: 2024-07-26
Attending: OBSTETRICS & GYNECOLOGY
Payer: COMMERCIAL

## 2024-07-26 LAB
ABO GROUP (TYPE) IN BLOOD: NORMAL
ALBUMIN SERPL BCP-MCNC: 3.3 G/DL (ref 3.4–5)
ALP SERPL-CCNC: 174 U/L (ref 33–110)
ALT SERPL W P-5'-P-CCNC: 19 U/L (ref 7–45)
ANION GAP SERPL CALC-SCNC: 17 MMOL/L (ref 10–20)
ANTIBODY SCREEN: NORMAL
AST SERPL W P-5'-P-CCNC: 27 U/L (ref 9–39)
BILIRUB SERPL-MCNC: 0.6 MG/DL (ref 0–1.2)
BUN SERPL-MCNC: 8 MG/DL (ref 6–23)
CALCIUM SERPL-MCNC: 8.8 MG/DL (ref 8.6–10.6)
CHLORIDE SERPL-SCNC: 103 MMOL/L (ref 98–107)
CO2 SERPL-SCNC: 19 MMOL/L (ref 21–32)
CREAT SERPL-MCNC: 0.52 MG/DL (ref 0.5–1.05)
CREAT UR-MCNC: 92.8 MG/DL (ref 20–320)
EGFRCR SERPLBLD CKD-EPI 2021: >90 ML/MIN/1.73M*2
ERYTHROCYTE [DISTWIDTH] IN BLOOD BY AUTOMATED COUNT: 13.8 % (ref 11.5–14.5)
GLUCOSE SERPL-MCNC: 93 MG/DL (ref 74–99)
HCT VFR BLD AUTO: 39.7 % (ref 36–46)
HGB BLD-MCNC: 13.1 G/DL (ref 12–16)
MCH RBC QN AUTO: 29.6 PG (ref 26–34)
MCHC RBC AUTO-ENTMCNC: 33 G/DL (ref 32–36)
MCV RBC AUTO: 90 FL (ref 80–100)
NRBC BLD-RTO: 0 /100 WBCS (ref 0–0)
PLATELET # BLD AUTO: 267 X10*3/UL (ref 150–450)
POTASSIUM SERPL-SCNC: 4 MMOL/L (ref 3.5–5.3)
PROT SERPL-MCNC: 6.4 G/DL (ref 6.4–8.2)
PROT UR-ACNC: 21 MG/DL (ref 5–24)
PROT/CREAT UR: 0.23 MG/MG CREAT (ref 0–0.17)
RBC # BLD AUTO: 4.43 X10*6/UL (ref 4–5.2)
RH FACTOR (ANTIGEN D): NORMAL
SODIUM SERPL-SCNC: 135 MMOL/L (ref 136–145)
TREPONEMA PALLIDUM IGG+IGM AB [PRESENCE] IN SERUM OR PLASMA BY IMMUNOASSAY: NONREACTIVE
WBC # BLD AUTO: 12.6 X10*3/UL (ref 4.4–11.3)

## 2024-07-26 PROCEDURE — 59409 OBSTETRICAL CARE: CPT

## 2024-07-26 PROCEDURE — 85027 COMPLETE CBC AUTOMATED: CPT | Performed by: NURSE PRACTITIONER

## 2024-07-26 PROCEDURE — 7100000016 HC LABOR RECOVERY PER HOUR

## 2024-07-26 PROCEDURE — 36415 COLL VENOUS BLD VENIPUNCTURE: CPT

## 2024-07-26 PROCEDURE — 59409 OBSTETRICAL CARE: CPT | Performed by: OBSTETRICS & GYNECOLOGY

## 2024-07-26 PROCEDURE — 2500000001 HC RX 250 WO HCPCS SELF ADMINISTERED DRUGS (ALT 637 FOR MEDICARE OP)

## 2024-07-26 PROCEDURE — 36415 COLL VENOUS BLD VENIPUNCTURE: CPT | Performed by: NURSE PRACTITIONER

## 2024-07-26 PROCEDURE — 1210000001 HC SEMI-PRIVATE ROOM DAILY

## 2024-07-26 PROCEDURE — 2500000005 HC RX 250 GENERAL PHARMACY W/O HCPCS

## 2024-07-26 PROCEDURE — 86780 TREPONEMA PALLIDUM: CPT | Performed by: NURSE PRACTITIONER

## 2024-07-26 PROCEDURE — 80053 COMPREHEN METABOLIC PANEL: CPT

## 2024-07-26 PROCEDURE — 82570 ASSAY OF URINE CREATININE: CPT

## 2024-07-26 PROCEDURE — 86901 BLOOD TYPING SEROLOGIC RH(D): CPT | Performed by: NURSE PRACTITIONER

## 2024-07-26 PROCEDURE — 99214 OFFICE O/P EST MOD 30 MIN: CPT

## 2024-07-26 PROCEDURE — 2500000004 HC RX 250 GENERAL PHARMACY W/ HCPCS (ALT 636 FOR OP/ED): Performed by: NURSE PRACTITIONER

## 2024-07-26 RX ORDER — METOCLOPRAMIDE HYDROCHLORIDE 5 MG/ML
10 INJECTION INTRAMUSCULAR; INTRAVENOUS EVERY 6 HOURS PRN
Status: DISCONTINUED | OUTPATIENT
Start: 2024-07-26 | End: 2024-07-26

## 2024-07-26 RX ORDER — TRANEXAMIC ACID 100 MG/ML
1000 INJECTION, SOLUTION INTRAVENOUS ONCE AS NEEDED
Status: DISCONTINUED | OUTPATIENT
Start: 2024-07-26 | End: 2024-07-26

## 2024-07-26 RX ORDER — OXYTOCIN 10 [USP'U]/ML
10 INJECTION, SOLUTION INTRAMUSCULAR; INTRAVENOUS ONCE AS NEEDED
Status: DISCONTINUED | OUTPATIENT
Start: 2024-07-26 | End: 2024-07-26

## 2024-07-26 RX ORDER — OXYTOCIN 10 [USP'U]/ML
10 INJECTION, SOLUTION INTRAMUSCULAR; INTRAVENOUS ONCE AS NEEDED
Status: DISCONTINUED | OUTPATIENT
Start: 2024-07-26 | End: 2024-07-29 | Stop reason: HOSPADM

## 2024-07-26 RX ORDER — ENOXAPARIN SODIUM 100 MG/ML
80 INJECTION SUBCUTANEOUS EVERY 24 HOURS
Status: DISCONTINUED | OUTPATIENT
Start: 2024-07-27 | End: 2024-07-29 | Stop reason: HOSPADM

## 2024-07-26 RX ORDER — METHYLERGONOVINE MALEATE 0.2 MG/ML
0.2 INJECTION INTRAVENOUS ONCE AS NEEDED
Status: DISCONTINUED | OUTPATIENT
Start: 2024-07-26 | End: 2024-07-26

## 2024-07-26 RX ORDER — CARBOPROST TROMETHAMINE 250 UG/ML
250 INJECTION, SOLUTION INTRAMUSCULAR ONCE AS NEEDED
Status: DISCONTINUED | OUTPATIENT
Start: 2024-07-26 | End: 2024-07-26

## 2024-07-26 RX ORDER — METHYLERGONOVINE MALEATE 0.2 MG/ML
0.2 INJECTION INTRAVENOUS ONCE AS NEEDED
Status: DISCONTINUED | OUTPATIENT
Start: 2024-07-26 | End: 2024-07-29 | Stop reason: HOSPADM

## 2024-07-26 RX ORDER — ONDANSETRON 4 MG/1
4 TABLET, FILM COATED ORAL EVERY 6 HOURS PRN
Status: DISCONTINUED | OUTPATIENT
Start: 2024-07-26 | End: 2024-07-26

## 2024-07-26 RX ORDER — ADHESIVE BANDAGE
10 BANDAGE TOPICAL
Status: DISCONTINUED | OUTPATIENT
Start: 2024-07-26 | End: 2024-07-29 | Stop reason: HOSPADM

## 2024-07-26 RX ORDER — ACETAMINOPHEN 325 MG/1
975 TABLET ORAL EVERY 6 HOURS
Status: DISCONTINUED | OUTPATIENT
Start: 2024-07-26 | End: 2024-07-29 | Stop reason: HOSPADM

## 2024-07-26 RX ORDER — HYDRALAZINE HYDROCHLORIDE 20 MG/ML
5 INJECTION INTRAMUSCULAR; INTRAVENOUS ONCE AS NEEDED
Status: DISCONTINUED | OUTPATIENT
Start: 2024-07-26 | End: 2024-07-26

## 2024-07-26 RX ORDER — BISACODYL 10 MG/1
10 SUPPOSITORY RECTAL DAILY PRN
Status: DISCONTINUED | OUTPATIENT
Start: 2024-07-26 | End: 2024-07-29 | Stop reason: HOSPADM

## 2024-07-26 RX ORDER — LIDOCAINE 560 MG/1
1 PATCH PERCUTANEOUS; TOPICAL; TRANSDERMAL
Status: DISCONTINUED | OUTPATIENT
Start: 2024-07-26 | End: 2024-07-29 | Stop reason: HOSPADM

## 2024-07-26 RX ORDER — METOCLOPRAMIDE 10 MG/1
10 TABLET ORAL EVERY 6 HOURS PRN
Status: DISCONTINUED | OUTPATIENT
Start: 2024-07-26 | End: 2024-07-26

## 2024-07-26 RX ORDER — LABETALOL HYDROCHLORIDE 5 MG/ML
20 INJECTION, SOLUTION INTRAVENOUS ONCE AS NEEDED
Status: DISCONTINUED | OUTPATIENT
Start: 2024-07-26 | End: 2024-07-29 | Stop reason: HOSPADM

## 2024-07-26 RX ORDER — MISOPROSTOL 200 UG/1
800 TABLET ORAL ONCE AS NEEDED
Status: DISCONTINUED | OUTPATIENT
Start: 2024-07-26 | End: 2024-07-26

## 2024-07-26 RX ORDER — LOPERAMIDE HYDROCHLORIDE 2 MG/1
4 CAPSULE ORAL EVERY 2 HOUR PRN
Status: DISCONTINUED | OUTPATIENT
Start: 2024-07-26 | End: 2024-07-26

## 2024-07-26 RX ORDER — SODIUM CHLORIDE, SODIUM LACTATE, POTASSIUM CHLORIDE, CALCIUM CHLORIDE 600; 310; 30; 20 MG/100ML; MG/100ML; MG/100ML; MG/100ML
125 INJECTION, SOLUTION INTRAVENOUS CONTINUOUS
Status: DISCONTINUED | OUTPATIENT
Start: 2024-07-26 | End: 2024-07-26

## 2024-07-26 RX ORDER — LABETALOL HYDROCHLORIDE 5 MG/ML
20 INJECTION, SOLUTION INTRAVENOUS ONCE AS NEEDED
Status: DISCONTINUED | OUTPATIENT
Start: 2024-07-26 | End: 2024-07-26

## 2024-07-26 RX ORDER — MISOPROSTOL 200 UG/1
800 TABLET ORAL ONCE AS NEEDED
Status: DISCONTINUED | OUTPATIENT
Start: 2024-07-26 | End: 2024-07-29 | Stop reason: HOSPADM

## 2024-07-26 RX ORDER — HYDRALAZINE HYDROCHLORIDE 20 MG/ML
5 INJECTION INTRAMUSCULAR; INTRAVENOUS ONCE AS NEEDED
Status: DISCONTINUED | OUTPATIENT
Start: 2024-07-26 | End: 2024-07-29 | Stop reason: HOSPADM

## 2024-07-26 RX ORDER — TERBUTALINE SULFATE 1 MG/ML
0.25 INJECTION SUBCUTANEOUS ONCE AS NEEDED
Status: DISCONTINUED | OUTPATIENT
Start: 2024-07-26 | End: 2024-07-26

## 2024-07-26 RX ORDER — CARBOPROST TROMETHAMINE 250 UG/ML
250 INJECTION, SOLUTION INTRAMUSCULAR ONCE AS NEEDED
Status: DISCONTINUED | OUTPATIENT
Start: 2024-07-26 | End: 2024-07-29 | Stop reason: HOSPADM

## 2024-07-26 RX ORDER — FAMOTIDINE 20 MG/1
20 TABLET, FILM COATED ORAL 2 TIMES DAILY PRN
Status: DISCONTINUED | OUTPATIENT
Start: 2024-07-26 | End: 2024-07-29 | Stop reason: HOSPADM

## 2024-07-26 RX ORDER — DIPHENHYDRAMINE HYDROCHLORIDE 50 MG/ML
25 INJECTION INTRAMUSCULAR; INTRAVENOUS EVERY 6 HOURS PRN
Status: DISCONTINUED | OUTPATIENT
Start: 2024-07-26 | End: 2024-07-29 | Stop reason: HOSPADM

## 2024-07-26 RX ORDER — OXYTOCIN/0.9 % SODIUM CHLORIDE 30/500 ML
60 PLASTIC BAG, INJECTION (ML) INTRAVENOUS ONCE AS NEEDED
Status: DISCONTINUED | OUTPATIENT
Start: 2024-07-26 | End: 2024-07-26

## 2024-07-26 RX ORDER — DIPHENHYDRAMINE HCL 25 MG
25 CAPSULE ORAL EVERY 6 HOURS PRN
Status: DISCONTINUED | OUTPATIENT
Start: 2024-07-26 | End: 2024-07-29 | Stop reason: HOSPADM

## 2024-07-26 RX ORDER — OXYTOCIN/0.9 % SODIUM CHLORIDE 30/500 ML
60 PLASTIC BAG, INJECTION (ML) INTRAVENOUS ONCE AS NEEDED
Status: DISCONTINUED | OUTPATIENT
Start: 2024-07-26 | End: 2024-07-29 | Stop reason: HOSPADM

## 2024-07-26 RX ORDER — ONDANSETRON HYDROCHLORIDE 2 MG/ML
4 INJECTION, SOLUTION INTRAVENOUS EVERY 6 HOURS PRN
Status: DISCONTINUED | OUTPATIENT
Start: 2024-07-26 | End: 2024-07-26

## 2024-07-26 RX ORDER — ONDANSETRON 4 MG/1
4 TABLET, FILM COATED ORAL EVERY 6 HOURS PRN
Status: DISCONTINUED | OUTPATIENT
Start: 2024-07-26 | End: 2024-07-29 | Stop reason: HOSPADM

## 2024-07-26 RX ORDER — TRANEXAMIC ACID 100 MG/ML
1000 INJECTION, SOLUTION INTRAVENOUS ONCE AS NEEDED
Status: DISCONTINUED | OUTPATIENT
Start: 2024-07-26 | End: 2024-07-29 | Stop reason: HOSPADM

## 2024-07-26 RX ORDER — NIFEDIPINE 10 MG/1
10 CAPSULE ORAL ONCE AS NEEDED
Status: DISCONTINUED | OUTPATIENT
Start: 2024-07-26 | End: 2024-07-26

## 2024-07-26 RX ORDER — IBUPROFEN 600 MG/1
600 TABLET ORAL EVERY 6 HOURS
Status: DISCONTINUED | OUTPATIENT
Start: 2024-07-26 | End: 2024-07-29 | Stop reason: HOSPADM

## 2024-07-26 RX ORDER — SIMETHICONE 80 MG
80 TABLET,CHEWABLE ORAL 4 TIMES DAILY PRN
Status: DISCONTINUED | OUTPATIENT
Start: 2024-07-26 | End: 2024-07-29 | Stop reason: HOSPADM

## 2024-07-26 RX ORDER — LOPERAMIDE HYDROCHLORIDE 2 MG/1
4 CAPSULE ORAL EVERY 2 HOUR PRN
Status: DISCONTINUED | OUTPATIENT
Start: 2024-07-26 | End: 2024-07-29 | Stop reason: HOSPADM

## 2024-07-26 RX ORDER — NIFEDIPINE 10 MG/1
10 CAPSULE ORAL ONCE AS NEEDED
Status: DISCONTINUED | OUTPATIENT
Start: 2024-07-26 | End: 2024-07-29 | Stop reason: HOSPADM

## 2024-07-26 RX ORDER — POLYETHYLENE GLYCOL 3350 17 G/17G
17 POWDER, FOR SOLUTION ORAL 2 TIMES DAILY PRN
Status: DISCONTINUED | OUTPATIENT
Start: 2024-07-26 | End: 2024-07-29 | Stop reason: HOSPADM

## 2024-07-26 RX ORDER — ONDANSETRON HYDROCHLORIDE 2 MG/ML
4 INJECTION, SOLUTION INTRAVENOUS EVERY 6 HOURS PRN
Status: DISCONTINUED | OUTPATIENT
Start: 2024-07-26 | End: 2024-07-29 | Stop reason: HOSPADM

## 2024-07-26 RX ORDER — LIDOCAINE HYDROCHLORIDE 10 MG/ML
30 INJECTION INFILTRATION; PERINEURAL ONCE AS NEEDED
Status: DISCONTINUED | OUTPATIENT
Start: 2024-07-26 | End: 2024-07-26

## 2024-07-26 SDOH — SOCIAL STABILITY: SOCIAL INSECURITY: HAS ANYONE EVER THREATENED TO HURT YOUR FAMILY OR YOUR PETS?: NO

## 2024-07-26 SDOH — SOCIAL STABILITY: SOCIAL INSECURITY: DOES ANYONE TRY TO KEEP YOU FROM HAVING/CONTACTING OTHER FRIENDS OR DOING THINGS OUTSIDE YOUR HOME?: NO

## 2024-07-26 SDOH — SOCIAL STABILITY: SOCIAL INSECURITY: HAVE YOU HAD ANY THOUGHTS OF HARMING ANYONE ELSE?: NO

## 2024-07-26 SDOH — HEALTH STABILITY: MENTAL HEALTH: HAVE YOU USED ANY PRESCRIPTION DRUGS OTHER THAN PRESCRIBED IN THE PAST 12 MONTHS?: NO

## 2024-07-26 SDOH — SOCIAL STABILITY: SOCIAL INSECURITY: ABUSE SCREEN: ADULT

## 2024-07-26 SDOH — SOCIAL STABILITY: SOCIAL INSECURITY: DO YOU FEEL ANYONE HAS EXPLOITED OR TAKEN ADVANTAGE OF YOU FINANCIALLY OR OF YOUR PERSONAL PROPERTY?: NO

## 2024-07-26 SDOH — HEALTH STABILITY: MENTAL HEALTH: NON-SPECIFIC ACTIVE SUICIDAL THOUGHTS (PAST 1 MONTH): NO

## 2024-07-26 SDOH — HEALTH STABILITY: MENTAL HEALTH: HAVE YOU USED ANY SUBSTANCES (CANABIS, COCAINE, HEROIN, HALLUCINOGENS, INHALANTS, ETC.) IN THE PAST 12 MONTHS?: NO

## 2024-07-26 SDOH — HEALTH STABILITY: MENTAL HEALTH: WISH TO BE DEAD (PAST 1 MONTH): NO

## 2024-07-26 SDOH — SOCIAL STABILITY: SOCIAL INSECURITY: ARE THERE ANY APPARENT SIGNS OF INJURIES/BEHAVIORS THAT COULD BE RELATED TO ABUSE/NEGLECT?: NO

## 2024-07-26 SDOH — HEALTH STABILITY: MENTAL HEALTH: WERE YOU ABLE TO COMPLETE ALL THE BEHAVIORAL HEALTH SCREENINGS?: YES

## 2024-07-26 SDOH — SOCIAL STABILITY: SOCIAL INSECURITY: HAVE YOU HAD THOUGHTS OF HARMING ANYONE ELSE?: NO

## 2024-07-26 SDOH — HEALTH STABILITY: MENTAL HEALTH: SUICIDAL BEHAVIOR (LIFETIME): NO

## 2024-07-26 SDOH — SOCIAL STABILITY: SOCIAL INSECURITY: ARE YOU OR HAVE YOU BEEN THREATENED OR ABUSED PHYSICALLY, EMOTIONALLY, OR SEXUALLY BY ANYONE?: NO

## 2024-07-26 SDOH — SOCIAL STABILITY: SOCIAL INSECURITY: PHYSICAL ABUSE: DENIES

## 2024-07-26 SDOH — ECONOMIC STABILITY: HOUSING INSECURITY: DO YOU FEEL UNSAFE GOING BACK TO THE PLACE WHERE YOU ARE LIVING?: NO

## 2024-07-26 SDOH — SOCIAL STABILITY: SOCIAL INSECURITY: VERBAL ABUSE: DENIES

## 2024-07-26 ASSESSMENT — PAIN SCALES - GENERAL
PAINLEVEL_OUTOF10: 2
PAINLEVEL_OUTOF10: 4
PAINLEVEL_OUTOF10: 5 - MODERATE PAIN

## 2024-07-26 ASSESSMENT — ACTIVITIES OF DAILY LIVING (ADL): LACK_OF_TRANSPORTATION: NO

## 2024-07-26 ASSESSMENT — LIFESTYLE VARIABLES
HOW OFTEN DO YOU HAVE 6 OR MORE DRINKS ON ONE OCCASION: NEVER
AUDIT-C TOTAL SCORE: 0
AUDIT-C TOTAL SCORE: 0
HOW MANY STANDARD DRINKS CONTAINING ALCOHOL DO YOU HAVE ON A TYPICAL DAY: PATIENT DOES NOT DRINK
SKIP TO QUESTIONS 9-10: 1
HOW OFTEN DO YOU HAVE A DRINK CONTAINING ALCOHOL: NEVER

## 2024-07-26 ASSESSMENT — PAIN DESCRIPTION - DESCRIPTORS: DESCRIPTORS: CRAMPING

## 2024-07-26 ASSESSMENT — PATIENT HEALTH QUESTIONNAIRE - PHQ9
1. LITTLE INTEREST OR PLEASURE IN DOING THINGS: NOT AT ALL
SUM OF ALL RESPONSES TO PHQ9 QUESTIONS 1 & 2: 0
2. FEELING DOWN, DEPRESSED OR HOPELESS: NOT AT ALL

## 2024-07-26 NOTE — H&P
Obstetrical Admission History and Physical     Sera Mehta is a 23 y.o.  at 37w4d. JESSY: 2024, by Ultrasound. Estimated fetal weight: 6lbs 10oz. She has had prenatal care with MINNIE Mcgee MD .    Chief Complaint: Contractions    Assessment/Plan    Active Labor  - Admit to L&D with routine lab orders  - Hemorrhage risk screen low; Type& Screen ordered  - Labor analgesia PRN, however pt expresses desire for NCB  - General diet   - encourage position changes  - Recheck cervix as clinically indicated by maternal or fetal status  - Anticipate SVB    NST pending  - some difficulty tracing FHR secondary to maternal habitus and movement  - IV fluid bolus now for minimal variability  - continue cEFM  - can consider intermittent fetal monitoring if criteria is met    GBS Neg  - prophylaxis not indicated    Class III Obesity  - s/p nml 1hr GCT, EFW 33% on 36w growth scan    GHTN  - MRBP of 155/73 noted on arrival, meeting diagnostic criteria for GHTN. Pt previously had one MRBP of 128/93 at a prenatal visit on 7/15/24.  - HELLP labs ordered, pending    report given to TOM Hook and MILAGRO Ellison to assume care for pt, as BMI of 50.17 risks her out of midwifery care    PHAN Gonzales-CNM      Principal Problem:    Labor and delivery indication for care or intervention ([facility])      pregnancy Problems (from 24 to present)       Problem Noted Resolved    Labor and delivery indication for care or intervention ([facility]) 2024 by Cecile Glez, APRN-CNP No    Priority:  Medium      Heartburn during pregnancy in third trimester ([facility]) 2024 by Lucy Mcgee MD No    Priority:  Medium      Back pain affecting pregnancy in second trimester ([facility]) 2024 by Lucy Mcgee MD No    Priority:  Medium      Overview Signed 2024  1:40 PM by Lucy Mcgee MD     - Pregnancy support belt requested         Encounter for supervision of normal first pregnancy in first trimester  ([facility]) 2024 by Lucy Mcgee MD No    Priority:  Medium      Overview Addendum 2024  9:45 AM by Lucy Mcgee MD     [x] Initial BMI: >40  [x] Dating US: 6w US  [x] Prenatal Labs:   [x] Pap: 2022 NILM  [x] Genetic Screenin/9   [x] bASA: Rx   [x] Anatomy US: Complete  [x] 1hr GCT at 24-28wks:  [x] Tdap (27-36wks): 6/3  [] Flu/COVID:   [x] Rhogam (if Rh neg): N/A  [x] GBS at 36 wks: Neg  [x] Breastfeeding: Breast  [x] Postpartum Birth control method: OCP  [] 39 weeks discussion of IOL vs. Expectant management:  [x] Mode of delivery: Anticipate            Obesity complicating pregnancy, third trimester ([facility]) 2024 by Lucy Mcgee MD No    Priority:  Medium      Overview Signed 2024  4:55 PM by Lucy Mcgee MD     - Plan for 34 week  testing and serial growths         Depression during pregnancy in second trimester ([facility]) 2024 by Lucy Mcgee MD No    Priority:  Medium      Overview Signed 2024  6:22 PM by Lucy Mcgee MD     - EPDS 17         Vaginal bleeding in pregnancy, second trimester ([facility]) 2024 by Kerri Mullen MD 2024 by Lucy Mcgee MD    30 weeks gestation of pregnancy ([facility]) 2024 by Kerri Mullen MD 2024 by Lucy Mcgee MD    Swelling of lower extremity during pregnancy in second trimester ([facility]) 2024 by Lucy Mcgee MD 2024 by Lucy Mcgee MD    Overview Signed 2024  1:40 PM by Lucy Mcgee MD     - Compression socks requested               Options for delivery have been discussed with the patient and she elects a vaginal delivery.  Labor has been discussed in detail. The risks, benefits, complications, alternatives, expected outcomes, potential problems during recuperation and recovery, and the risks of not performing the procedure were discussed with the patient. The patient stated understanding that the risks of delivery include,  but are not limited to: death; reaction to medications; injury to bowel, bladder, ureters, uterus, cervix, vagina, and other pelvic and abdominal structures, infection; blood loss and possible need for transfusion; and potential need for surgery, including hysterectomy. The risks of injury to the infant during delivery were also discussed. All questions were answered. There was concurrence with the planned procedure, and the patient wanted to proceed.    Admit to inpatient status. I anticipate that this patient will require a stay exceeding at least 2 midnights for delivery and postpartum care.  Active management of labor.  Management of pregnancy complications, as indicated.    Vlad Zamora is here complaining of regular, painful contractions since 0200 this AM. She is coping well, utilizing patterned breathing. She denies LOF or VB. She endorses good FM.   She denies HA, vision changes, or RUQ pain.      Obstetrical History   OB History    Para Term  AB Living   1             SAB IAB Ectopic Multiple Live Births                  # Outcome Date GA Lbr Oziel/2nd Weight Sex Type Anes PTL Lv   1 Current                Past Medical History  Past Medical History:   Diagnosis Date    Encounter for screening for infections with a predominantly sexual mode of transmission 2020    Screen for STD (sexually transmitted disease)    Other specified health status     Known health problems: none        Past Surgical History   No past surgical history on file.    Social History  Social History     Tobacco Use    Smoking status: Former     Types: Cigars    Smokeless tobacco: Never   Substance Use Topics    Alcohol use: Not Currently     Substance and Sexual Activity   Drug Use Never       Allergies  Patient has no known allergies.     Medications  Medications Prior to Admission   Medication Sig Dispense Refill Last Dose    aspirin 81 mg EC tablet Take 2 tablets (162 mg) by mouth once daily. Start taking  at 12 weeks and continue until you have your baby 60 tablet 11     calcium carbonate EX (Tums Extra Strength) 300 mg (750 mg) chewable tablet Chew 300 mg once daily.       famotidine (Pepcid) 20 mg tablet Take 1 tablet (20 mg) by mouth 2 times a day. 90 tablet 3     prenatal vitamin, iron-folic, (Prenatal Vitamin) 27 mg iron-800 mcg folic acid tablet Take 1 tablet by mouth once daily. 90 tablet 3        Objective    Last Vitals  Temp Pulse Resp BP MAP O2 Sat   (!) 35.9 °C (96.6 °F) 97 16 155/73   100 %     Physical Examination  GENERAL: Examination reveals a well developed, well nourished and obese, gravid female in no acute distress. She is alert and cooperative.  HEENT:  normocephalic, atraumatic  LUNGS:  regular, unlabored respirations  FHR: 130bpm, minimal with periods of moderate variability, no accels, no decels  Ctx: q2-3min by palpation, [place] not monitoring well  The fetus is in a vertex presentation, determined by ultrasound, performed by QUYNH CHISHOLM  Current Estimated Fetal Weight 6lbs 10oz established by  extrapolation from 36w0d US on 7/15/24   VAGINA: normal appearing vagina with normal color and discharge and no lesions noted  CERVIX: 5 cm dilated, 100 % effaced, -2 station; MEMBRANES are  intact  SKIN: normal coloration and turgor, no rashes  NEUROLOGICAL: alert, oriented, normal speech, no focal findings or movement disorder noted  PSYCHOLOGICAL: awake and alert; oriented to person, place, and time    Lab Review  Labs in chart were reviewed.  GBS Neg

## 2024-07-26 NOTE — SIGNIFICANT EVENT
Patient meets criteria for home monitoring of blood pressure post discharge.  Reason:  current gestational hypertension. Met with patient to assess for availability of home BP monitor.   Patient does not have access to BP monitor at home.  Pt agreed to order home BP monitor from [employer].   X- Large BP monitor delivered to room. Patient educated on how to use BP monitor. Patient educated on importance of continuing to monitor BP at home, recording BP on home monitoring log and s/sx of when to call her provider.  Pt verbalized understanding the above information.

## 2024-07-26 NOTE — SIGNIFICANT EVENT
"Labor Progress Note    Subjective: Patient feeling well immediately postpartum. Bleeding subsided, fundus firm. Patient requesting placenta to be brought home with her. Placenta release form given to nursing team for signature.    Objective:  Vitals: /58   Pulse 86   Temp 36 °C (96.8 °F) (Temporal)   Resp 16   Ht 1.676 m (5' 6\")   Wt 141 kg (310 lb 13.6 oz)   SpO2 100%   BMI 50.17 kg/m²     Blood Pressures         7/26/2024  0945 7/26/2024  1044 7/26/2024  1204 7/26/2024  1220 7/26/2024  1235    BP: 155/73 115/71 133/60 115/58 103/58          A/P:  Continue routine postpartum care  Continue monitoring BP, largely normotensive since arrival    Jennifer Valiente MD  OB/GYN   "

## 2024-07-26 NOTE — DISCHARGE INSTRUCTIONS

## 2024-07-26 NOTE — L&D DELIVERY NOTE
OB Delivery Note  2024  Sera Mehta  23 y.o.   Vaginal, Spontaneous      over intact perineum, no epidural or analgesia. One periurethral laceration that was hemostatic noted, not repaired. EBL 300mL. Placenta to be returned to family.    Gestational Age: 37w4d  /Para:   Quantitative Blood Loss: Admission to Discharge: 300 mL (2024  9:58 AM - 2024 12:35 PM)    Shilpi Mehta [[id number]]      Labor Events    Fluid color: Clear  Labor type: Spontaneous Onset of Labor  Labor allowed to proceed with plans for an attempted vaginal birth?: Yes  Augmentation: None  Complications: None       Labor Event Times    Labor onset date/time: 2024 0200  Dilation complete date/time: 2024 1120  Start pushing date/time: 2024 112       Placenta    Placenta delivery date/time: 2024 1135  Placenta removal: Spontaneous  Placenta appearance: Intact  Placenta disposition: family       Cord    Vessels: 3 vessels  Complications: None  Delayed cord clamping?: Yes  Cord clamped date/time: 2024 11:30:00  Cord blood disposition: Lab  Gases sent?: No       Lacerations    Episiotomy: None  Perineal laceration: None  Periurethral laceration?: Yes  Periurethral laceration location: left  Periurethral laceration repaired?: No  Repair suture: None       Anesthesia    Method: None       Operative Delivery    Forceps attempted?: No  Vacuum extractor attempted?: No       Shoulder Dystocia    Shoulder dystocia present?: No        Delivery    Time head delivered: 2024 11:29:00  Birth date/time: 2024 11:29:00  Delivery type: Vaginal, Spontaneous  Complications: None       Resuscitation    Method: Tactile stimulation, Suctioning       Apgars    Living status: Living  Apgar Component Scores:  1 min.:  5 min.:  10 min.:  15 min.:  20 min.:    Skin color:  1  1       Heart rate:  2  2       Reflex irritability:  2  2       Muscle tone:  2  2       Respiratory effort:  2  2       Total:   9  9       Apgars assigned by: RACHELL BERGER       Delivery Providers    Delivering clinician:    Provider Role     Delivery Nurse     Nursery Nurse    Jennifer Valiente MD Resident    Lucy Mcgee MD Obstetrician/Gynecologist                 Jennifer Valiente MD

## 2024-07-26 NOTE — CARE PLAN
The patient's goals for the shift include      The clinical goals for the shift include Adequate pain control, normal VS (BP<160/110), normal transition to post partum    Problem: Postpartum  Goal: Experiences normal postpartum course  Outcome: Progressing  Goal: Appropriate maternal -  bonding  Outcome: Progressing  Goal: Establish and maintain infant feeding pattern for adequate nutrition  Outcome: Progressing  Goal: No s/sx infection  Outcome: Progressing  Goal: No s/sx of hemorrhage  Outcome: Progressing  Goal: Minimal s/sx of HDP and BP<160/110  Outcome: Progressing     Problem: Safety - Adult  Goal: Free from fall injury  Outcome: Progressing

## 2024-07-27 PROCEDURE — 2500000001 HC RX 250 WO HCPCS SELF ADMINISTERED DRUGS (ALT 637 FOR MEDICARE OP)

## 2024-07-27 PROCEDURE — 1210000001 HC SEMI-PRIVATE ROOM DAILY

## 2024-07-27 PROCEDURE — 2500000004 HC RX 250 GENERAL PHARMACY W/ HCPCS (ALT 636 FOR OP/ED)

## 2024-07-27 ASSESSMENT — PAIN SCALES - GENERAL
PAINLEVEL_OUTOF10: 2
PAINLEVEL_OUTOF10: 0 - NO PAIN
PAINLEVEL_OUTOF10: 0 - NO PAIN
PAINLEVEL_OUTOF10: 4
PAINLEVEL_OUTOF10: 0 - NO PAIN

## 2024-07-27 ASSESSMENT — PAIN DESCRIPTION - LOCATION: LOCATION: ABDOMEN

## 2024-07-27 ASSESSMENT — PAIN DESCRIPTION - DESCRIPTORS: DESCRIPTORS: CRAMPING

## 2024-07-27 NOTE — SIGNIFICANT EVENT
MD to beside. Nurse reported L calf tenderness and increased warmth in the extremity.  On exam, L LE was noted to have swelling w/ L being larger than R. + Bebe's sign with calf tenderness on palpation. Large varicose vein noted on left calf and thigh. No skin discoloration or warmth.    Pt mentions this issue with calf swelling and pain has been ongoing since the beginning of pregnancy. She states her current symptoms are not new and denies any acute change from baseline She mentions her physician who was following through pregnancy stated the symptoms were likely due to varicose veins and there are plans for intervention PP. She denies SOB, difficulty breathing, or chest pain.    Pt discussed with Dr. Drake. Low concern at this time for DVT. Cont to monitor.    Gina Dominguez MD  OBGYN, PGY-1

## 2024-07-27 NOTE — PROGRESS NOTES
Postpartum Progress Note    Assessment/Plan   Sera Mehta is a 23 y.o., , who delivered at 37w4d gestation    Now PPD#1 s/p Vaginal, Spontaneous on 2024  - continue routine postpartum care  - pain well controlled on po medications  - dvt risk score DVT Score: 5 , ppx with lovenox  - Hgb:   Results from last 7 days   Lab Units 24  1036   HEMOGLOBIN g/dL 13.1      gHTN  - dx by 2 mild range pressures >4hrs apart; asymptomatic  - now normotensive on no meds  - HELLP labs negative  - BP cuff for home  - reviewed 3 day stay for BP monitoring. pt verbalized understanding    Maternal Well-Being  - depression, no meds, for SW c/s prior to dc  - emotional support provided    [place] Feeding  - breastfeeding/pumping encouraged; lactation consult prn    Contraception  - education provided  - POPs    Dispo  - Anticipate DC PPD3 pending BP control.  - The signs and symptoms of PEC were reviewed with the patient, including unrelenting headache, vision changes/blurred vision, and pain underneath the right breast.   - BP cuff for home for checking BP BID. Pt instructed to call primary OB if SBP > 160 or DBP > 110.   Follow up in 2-5 days for BP check with your OB provider. and Follow up in 4-6 wk for postpartum visit with your OB provider.     SURYA SteelC  Postpartum Pager [phone]    Principal Problem:    Labor and delivery indication for care or intervention ([facility])    pregnancy Problems (from 24 to present)       Problem Noted Resolved    Labor and delivery indication for care or intervention ([facility]) 2024 by PHAN Dick-CNP No    Priority:  Medium      Heartburn during pregnancy in third trimester ([facility]) 2024 by Lucy Mcgee MD No    Priority:  Medium      Back pain affecting pregnancy in second trimester ([facility]) 2024 by Lucy Mcgee MD No    Priority:  Medium      Overview Signed 2024  1:40 PM by Lucy Mcgee MD     - Pregnancy support  belt requested         Encounter for supervision of normal first pregnancy in first trimester ([facility]) 2024 by Lucy Mcgee MD No    Priority:  Medium      Overview Addendum 2024  9:45 AM by Lucy Mcgee MD     [x] Initial BMI: >40  [x] Dating US: 6w US  [x] Prenatal Labs:   [x] Pap: 2022 NILM  [x] Genetic Screenin/9   [x] bASA: Rx   [x] Anatomy US: Complete  [x] 1hr GCT at 24-28wks:  [x] Tdap (27-36wks): 6/3  [] Flu/COVID:   [x] Rhogam (if Rh neg): N/A  [x] GBS at 36 wks: Neg  [x] Breastfeeding: Breast  [x] Postpartum Birth control method: OCP  [] 39 weeks discussion of IOL vs. Expectant management:  [x] Mode of delivery: Anticipate            Obesity complicating pregnancy, third trimester ([facility]) 2024 by Lucy Mcgee MD No    Priority:  Medium      Overview Signed 2024  4:55 PM by Lucy Mcgee MD     - Plan for 34 week  testing and serial growths         Depression during pregnancy in second trimester ([facility]) 2024 by Lucy Mcgee MD No    Priority:  Medium      Overview Signed 2024  6:22 PM by Lucy Mcgee MD     - EPDS 17         Vaginal bleeding in pregnancy, second trimester ([facility]) 2024 by Kerri Mullen MD 2024 by Lucy Mcgee MD    30 weeks gestation of pregnancy ([facility]) 2024 by Kerri Mullen MD 2024 by Lucy Mcgee MD    Swelling of lower extremity during pregnancy in second trimester ([facility]) 2024 by Lucy Mcgee MD 2024 by Lucy Mcgee MD    Overview Signed 2024  1:40 PM by Lucy Mcgee MD     - Compression socks requested               Hospital course: gestational hypertension  Vaginal Birth  Patient is currently breastfeedingThe patient's blood type is O POS. The baby's blood type is O POS. Rhogam is not indicated.    Subjective   Her pain is well controlled with current medications  She is passing flatus  She is ambulating well  She is  tolerating a Adult diet Regular  She reports no breast or nursing problems  She denies emotional concerns today   Her plan for contraception is oral contraceptives     Pt seen at the bedside in NAD. Denies HA, N/V, RUQ pain, vision changes.   Denies CP, SOB, calf pain, fever, passage of large clots.     Objective   Allergies:   Patient has no known allergies.         Last Vitals:  Temp Pulse Resp BP MAP Pulse Ox   36.1 °C (97 °F) 75 20 109/71   98 %     Vitals Min/Max Last 24 Hours:  Temp  Min: 36.1 °C (97 °F)  Max: 36.7 °C (98.1 °F)  Pulse  Min: 75  Max: 94  Resp  Min: 16  Max: 20  BP  Min: 106/69  Max: 117/83    Intake/Output:   No intake or output data in the 24 hours ending 07/27/24 1816    Physical Exam:  General: Examination reveals a well developed, well nourished, female, in no acute distress. She is alert and cooperative.  HEENT: External ears normal. Nose normal, no erythema or discharge.  Neck: supple, no significant adenopathy.  Lungs: breathing even and unlabored   Cardiac: warm and well perfused .  Fundus: firm and below umbilicus. Lochia light  Extremities: no redness or tenderness in the calves or thighs, no edema.  Neurological: alert, oriented, normal speech, no focal findings or movement disorder noted.  Psychological: awake and alert; oriented to person, place, and time.  Skin: no rashes or lesions    Lab Data:  Labs in chart were reviewed.

## 2024-07-27 NOTE — CARE PLAN
The patient's goals for the shift include rest    The clinical goals for the shift include VS remain WNL until end of shift      Problem: Postpartum  Goal: Experiences normal postpartum course  Outcome: Progressing  Goal: Appropriate maternal -  bonding  Outcome: Progressing  Goal: Establish and maintain infant feeding pattern for adequate nutrition  Outcome: Progressing  Goal: No s/sx infection  Outcome: Progressing  Goal: No s/sx of hemorrhage  Outcome: Progressing  Goal: Minimal s/sx of HDP and BP<160/110  Outcome: Progressing     Problem: Safety - Adult  Goal: Free from fall injury  Outcome: Progressing

## 2024-07-28 VITALS
WEIGHT: 293 LBS | SYSTOLIC BLOOD PRESSURE: 105 MMHG | HEIGHT: 66 IN | TEMPERATURE: 97.7 F | RESPIRATION RATE: 16 BRPM | DIASTOLIC BLOOD PRESSURE: 68 MMHG | BODY MASS INDEX: 47.09 KG/M2 | OXYGEN SATURATION: 98 % | HEART RATE: 86 BPM

## 2024-07-28 PROCEDURE — 2500000004 HC RX 250 GENERAL PHARMACY W/ HCPCS (ALT 636 FOR OP/ED)

## 2024-07-28 PROCEDURE — 2500000001 HC RX 250 WO HCPCS SELF ADMINISTERED DRUGS (ALT 637 FOR MEDICARE OP)

## 2024-07-28 PROCEDURE — 1210000001 HC SEMI-PRIVATE ROOM DAILY

## 2024-07-28 ASSESSMENT — PAIN SCALES - GENERAL
PAINLEVEL_OUTOF10: 0 - NO PAIN
PAINLEVEL_OUTOF10: 0 - NO PAIN
PAINLEVEL_OUTOF10: 3
PAINLEVEL_OUTOF10: 0 - NO PAIN

## 2024-07-28 ASSESSMENT — PAIN DESCRIPTION - DESCRIPTORS: DESCRIPTORS: CRAMPING;DISCOMFORT;SORE

## 2024-07-28 NOTE — CARE PLAN
The patient's goals for the shift include rest    The clinical goals for the shift include adequate pain control, vitals WNL      Problem: Postpartum  Goal: Experiences normal postpartum course  Outcome: Progressing  Goal: Appropriate maternal -  bonding  Outcome: Progressing  Goal: Establish and maintain infant feeding pattern for adequate nutrition  Outcome: Progressing  Goal: No s/sx infection  Outcome: Progressing  Goal: No s/sx of hemorrhage  Outcome: Progressing  Goal: Minimal s/sx of HDP and BP<160/110  Outcome: Progressing     Problem: Safety - Adult  Goal: Free from fall injury  Outcome: Progressing     VSS, bleeding and swelling minimal, pain controlled with medications, breastfeeding with RN support.

## 2024-07-28 NOTE — PROGRESS NOTES
ROMERO met with Ms. Mehta, mother of baby boy Tyson. Also present was FOB. Ms. Mehta reports that she has necessary baby supplies such as a car seat and safe sleep arrangements. She trying to contact WIC to enroll and SW gave a list of WIC locations. ROMERO discussed Post partum depression symptoms and treatment options. Ms. Landrum reports that currently she does not have signs of depression. Clear for discharge when medically ready.    TRAMAINE Leslie

## 2024-07-28 NOTE — PROGRESS NOTES
Postpartum Progress Note    Assessment/Plan   Sera Mehta is a 23 y.o., , who delivered at 37w4d gestation    Now PPD#2 s/p Vaginal, Spontaneous on 2024  - continue routine postpartum care  - pain well controlled on po medications  - dvt risk score DVT Score: 5 , ppx with lovenox   - Hgb:   Results from last 7 days   Lab Units 24  1036   HEMOGLOBIN g/dL 13.1      gHTN  - dx by 2 mild range pressures >4hrs apart; asymptomatic  - now normotensive on no meds  - HELLP labs negative  - BP cuff for home  - reviewed 3 day stay for BP monitoring. pt verbalized understanding    Maternal Well-Being  - depression, mood stable, no meds  - emotional support provided     Feeding  - breastfeeding/pumping encouraged; lactation consult prn    Contraception  - education provided  - POPs    Dispo  - Anticipate DC PPD3 pending BP control.  - The signs and symptoms of PEC were reviewed with the patient, including unrelenting headache, vision changes/blurred vision, and pain underneath the right breast.   - BP cuff for home for checking BP BID. Pt instructed to call primary OB if SBP > 160 or DBP > 110.   Follow up in 2-5 days for BP check with your OB provider. and Follow up in 4-6 wk for postpartum visit with your OB provider.     SURYA SteelC  Postpartum Pager [phone]    Principal Problem:    Labor and delivery indication for care or intervention ([facility])    pregnancy Problems (from 24 to present)       Problem Noted Resolved    Labor and delivery indication for care or intervention ([facility]) 2024 by PHAN Dick-CNP No    Priority:  Medium      Heartburn during pregnancy in third trimester ([facility]) 2024 by Lucy Mcgee MD No    Priority:  Medium      Back pain affecting pregnancy in second trimester ([facility]) 2024 by Lucy Mcgee MD No    Priority:  Medium      Overview Signed 2024  1:40 PM by Lucy Mcgee MD     - Pregnancy support belt  requested         Encounter for supervision of normal first pregnancy in first trimester ([facility]) 2024 by Lucy Mcgee MD No    Priority:  Medium      Overview Addendum 2024  9:45 AM by Lucy Mcgee MD     [x] Initial BMI: >40  [x] Dating US: 6w US  [x] Prenatal Labs:   [x] Pap: 2022 NILM  [x] Genetic Screenin/9   [x] bASA: Rx   [x] Anatomy US: Complete  [x] 1hr GCT at 24-28wks:  [x] Tdap (27-36wks): 6/3  [] Flu/COVID:   [x] Rhogam (if Rh neg): N/A  [x] GBS at 36 wks: Neg  [x] Breastfeeding: Breast  [x] Postpartum Birth control method: OCP  [] 39 weeks discussion of IOL vs. Expectant management:  [x] Mode of delivery: Anticipate            Obesity complicating pregnancy, third trimester ([facility]) 2024 by Lucy Mcgee MD No    Priority:  Medium      Overview Signed 2024  4:55 PM by Lucy Mcgee MD     - Plan for 34 week  testing and serial growths         Depression during pregnancy in second trimester ([facility]) 2024 by Lucy Mcgee MD No    Priority:  Medium      Overview Signed 2024  6:22 PM by Lucy Mcgee MD     - EPDS 17         Vaginal bleeding in pregnancy, second trimester ([facility]) 2024 by Kerri Mullen MD 2024 by Lucy Mcgee MD    30 weeks gestation of pregnancy ([facility]) 2024 by Kerri Mullen MD 2024 by Lucy Mcgee MD    Swelling of lower extremity during pregnancy in second trimester ([facility]) 2024 by Lucy Mcgee MD 2024 by Lucy Mcgee MD    Overview Signed 2024  1:40 PM by Lucy Mcgee MD     - Compression socks requested               Hospital course: gestational hypertension  Vaginal Birth  Patient is currently breastfeedingThe patient's blood type is O POS. The baby's blood type is O POS. Rhogam is not indicated.    Subjective   Her pain is well controlled with current medications  She is passing flatus  She is ambulating well  She is  tolerating a Adult diet Regular  She reports no breast or nursing problems  She denies emotional concerns today   Her plan for contraception is oral contraceptives     Pt seen at the bedside in NAD. Denies HA, N/V, RUQ pain, vision changes.   Denies CP, SOB, calf pain, fever, passage of large clots.     Objective   Allergies:   Patient has no known allergies.         Last Vitals:  Temp Pulse Resp BP MAP Pulse Ox   36.4 °C (97.5 °F) 77 18 115/81   98 %     Vitals Min/Max Last 24 Hours:  Temp  Min: 36.1 °C (97 °F)  Max: 36.7 °C (98.1 °F)  Pulse  Min: 72  Max: 97  Resp  Min: 16  Max: 20  BP  Min: 105/68  Max: 133/75    Intake/Output:     Intake/Output Summary (Last 24 hours) at 7/28/2024 1437  Last data filed at 7/28/2024 0022  Gross per 24 hour   Intake --   Output 80 ml   Net -80 ml       Physical Exam:  General: Examination reveals a well developed, well nourished, female, in no acute distress. She is alert and cooperative.  HEENT: External ears normal. Nose normal, no erythema or discharge.  Neck: supple, no significant adenopathy.  Lungs: breathing even and unlabored   Cardiac: warm and well perfused .  Fundus: firm and below umbilicus. Lochia light  Extremities: no redness or tenderness in the calves or thighs, no edema.  Neurological: alert, oriented, normal speech, no focal findings or movement disorder noted.  Psychological: awake and alert; oriented to person, place, and time.  Skin: no rashes or lesions    Lab Data:  Labs in chart were reviewed.

## 2024-07-28 NOTE — CARE PLAN
The patient's goals for the shift include   Problem: Postpartum  Goal: Experiences normal postpartum course  Outcome: Progressing  Goal: Appropriate maternal -  bonding  Outcome: Progressing     Problem: Safety - Adult  Goal: Free from fall injury  Outcome: Progressing           VSS, bleeding and swelling minimal, pain controlled with medications, IV flushed, breastfeeding/pumping.

## 2024-07-28 NOTE — LACTATION NOTE
Lactation Consultant Note  Lactation Consultation  Reason for Consult: Initial assessment  Consultant Name: Teagan Thrasher RN, IBCLC    Maternal Information  Has mother  before?: No  Infant to breast within first 2 hours of birth?: Yes    Maternal Assessment  Breast Assessment: Large, Pendulous, Soft, Warm, Compressible  Nipple Assessment: Intact, Erect  Areola Assessment: Normal    Infant Assessment  Infant Behavior: Awake, Readiness to feed, Rooting response, Feeding cues observed  Infant Assessment: Palate - high/arch/bubble/normal, Tongue protrudes over alveolar ridge, Good cupping of tongue, Good lateral movement of tongue    Feeding Assessment  Nutrition Source: Breastmilk  Feeding Method: Nursing at the breast  Feeding Position: Football/seated, Skin to skin, Nipple to nose, Nose lightly touching breast, Chin tucked into breast, Baby's head too high over breast  Suck/Feeding: Sustained, Baby led rhythmically, Audible swallowing, Coordinated suck/swallow/breathe, Content after feeding  Latch Assessment: Shallow latch, Latch achieved, Latch is painful, Areolar attachment, Flanged lips, Comfortable latch, Frequent audible swallows    LATCH TOOL  Latch: Grasps breast, tongue down, lips flanged, rhythmic sucking  Audible Swallowing: Spontaneous and intermittent (24 hours old)  Type of Nipple: Everted (After stimulation)  Comfort (Breast/Nipple): Soft/non-tender  Hold (Positioning): Minimal assist, teach one side, mother does other, staff holds  LATCH Score: 9    Breast Pump  Pump: Hospital grade electric pump  Breast Shield Size and Type: 24 mm    Other OB Lactation Tools       Patient Follow-up  Outpatient Lactation Follow-up: Recommended    Other OB Lactation Documentation  Maternal Risk Factors: Obesity (pre-pregnancy BMI >30), Hypertension  Infant Risk Factors: Early term birth 37-39 weeks    Recommendations/Summary  In to see mom for latching assistance. Mom stated that infant was making clicking  noises and not sustaining a latch overnight. Placed infant skin to skin with mom and mom attempted to latch infant via football hold. Mom has large pendulous breasts. Placed pillows under infant and mom's arms for comfort and aligned infant appropriately for a deeper latch. On initial latch, latch was shallow and painful for mom. With mom's permission, I assessed infant's sucking pattern. With some suck training, infant's tongue protruded over alveolar ridge, had good cupping, and lateralization of the tongue. Placed infant back skin to skin with mom and mom re-attempted latch. After several attempts and some assistance in placing infant in more sniffing position, while bringing infant to breast, mom achieved a deeper latch. Mom stated that latch was not painful and infant proceeded to have a rhythmic suck, with long jaw movements, and swallows appreciated. Infant sustained this feeding for a full 20 minutes and then was sleepy, so I suggested placing infant in skin to skin and burping infant. Mom then attempted to latch infant to right breast via football hold. After 3 attempts, mom was able to achieve a deep latch and infant proceeded to rhythmically suck.   I reviewed the use of the pump with mom, mom practiced using the pump and is using 24mm flanges per my measurement. I recommended that if infant does not sustain a sucking pattern at breast for at least 10-15min or if mom is not able to achieve a latch, that mom should pump for 20 minutes to protect milk supply and re-feed any expressed breast milk. I encouraged mom to continue feeding on demand at least 8-12 times a day and discussed potential supplementation of DBM based on infant's weight loss if needed.   I requested for nurse to notify me of today's weight loss for potential supplementation if needed. I encouraged mom to call for any further assistance if needed.     1230 Infant's weight loss is now 8.02% and infant is 50th-75th percentile on NEWT growth  curve. In to see mom to follow up on feeding plan. Mom independently latched infant to left breast. I assisted in flanging out lower lip for a more comfortable latch. Mom stated she did not have any pain with this latch. Infant sustaining a rhythmic suck with long jaw movement and swallows noted. I encouraged mom to feed infant frequently and pump after at least every other feeding due to maternal exhaustion to ensure adequate milk supply. Mom agreeable. I encouraged to call for further assistance or if any questions arise.

## 2024-07-29 VITALS
TEMPERATURE: 97.2 F | OXYGEN SATURATION: 98 % | BODY MASS INDEX: 47.09 KG/M2 | HEART RATE: 74 BPM | WEIGHT: 293 LBS | HEIGHT: 66 IN | SYSTOLIC BLOOD PRESSURE: 98 MMHG | DIASTOLIC BLOOD PRESSURE: 62 MMHG | RESPIRATION RATE: 16 BRPM

## 2024-07-29 PROBLEM — O13.9 GESTATIONAL HYPERTENSION (HHS-HCC): Status: ACTIVE | Noted: 2024-07-29

## 2024-07-29 PROCEDURE — 2500000001 HC RX 250 WO HCPCS SELF ADMINISTERED DRUGS (ALT 637 FOR MEDICARE OP)

## 2024-07-29 PROCEDURE — 99238 HOSP IP/OBS DSCHRG MGMT 30/<: CPT | Performed by: NURSE PRACTITIONER

## 2024-07-29 RX ORDER — IBUPROFEN 600 MG/1
600 TABLET ORAL EVERY 6 HOURS
Qty: 30 TABLET | Refills: 0 | Status: SHIPPED | OUTPATIENT
Start: 2024-07-29

## 2024-07-29 RX ORDER — ACETAMINOPHEN 325 MG/1
975 TABLET ORAL EVERY 6 HOURS
Qty: 90 TABLET | Refills: 0 | Status: SHIPPED | OUTPATIENT
Start: 2024-07-29

## 2024-07-29 ASSESSMENT — PAIN DESCRIPTION - DESCRIPTORS
DESCRIPTORS: DISCOMFORT;SORE
DESCRIPTORS: DISCOMFORT;SORE

## 2024-07-29 ASSESSMENT — PAIN SCALES - GENERAL
PAINLEVEL_OUTOF10: 3
PAINLEVEL_OUTOF10: 0 - NO PAIN
PAINLEVEL_OUTOF10: 4

## 2024-07-29 NOTE — LACTATION NOTE
Lactation Consultant Note  Lactation Consultation  Reason for Consult: Follow-up assessment  Consultant Name: Debby Yoon, RN, IBCLC    Maternal Information  Has mother  before?: No  Infant to breast within first 2 hours of birth?: Yes    Maternal Assessment  Breast Assessment: Pendulous, Large, Soft, Warm, Compressible  Nipple Assessment: Intact, Erect, Sore (expressible on both sides)  Areola Assessment: Normal    Infant Assessment  Infant Behavior: Sleepy    Feeding Assessment  Nutrition Source: Breastmilk  Feeding Method: Nursing at the breast    LATCH TOOL       Breast Pump  Pump: Hospital grade electric pump  Frequency: Other (comment)  Duration: Initiate phase  Breast Shield Size and Type: 24 mm    Other OB Lactation Tools       Patient Follow-up  Inpatient Lactation Follow-up Needed : Yes  Outpatient Lactation Follow-up: Recommended    Other OB Lactation Documentation  Maternal Risk Factors: Obesity (pre-pregnancy BMI >30), Hypertension  Infant Risk Factors: Early term birth 37-39 weeks    Recommendations/Summary  LC in room to see dyad before discharge. Baby finally pooped for the first time in three days which was a pediatric concern. Baby is 72 hours old and has been EBF. Discharge weight and TCB appropriate. Mom reports that baby struggles to keep his mouth open wide. Mom reports that her nipples are sore. Mom's breasts are large and pendulous. Nipples are intact and well-appearing. LC Encouraged mom to use lanolin and or expressed colostrum for comfort. Mom able to express colostrum bilaterally by herself. Mom reports that baby had just fed, baby asleep in crib. LC asked mom to call with next feed to see if baby can achieve a deeper latch. Bedside pump was originally set up due to baby being sleepy. At 48 HOL baby did lose 8% of his birth weight, but did gain with the following weight (not sure if that is due to adequate milk transfer or because baby has not stooled in three days). Mom reports  latches are shallow but adequate in length and baby is content with feeds. LC encouraged mom to pump if baby has a sleepy feed or if follow up weight at outpatient appointment has worsened. LC encouraged mom to wear a supportive, non-restrictive bra to help prevent severe engorgement. LC reviewed engorgement and how to treat it.   LC encouraged mom to call out with the next feed.

## 2024-07-29 NOTE — DISCHARGE SUMMARY
Discharge Summary    Admission Date: 7/26/2024  Discharge Date: .toay      Discharge Diagnosis  Vaginal delivery ([facility])    Hospital Course  Delivery Date: 7/26/2024 11:29 AM  Delivery type: Vaginal, Spontaneous   GA at delivery: 37w4d  Outcome: Living  Anesthesia during delivery: None  Intrapartum complications: None  Feeding method: Breastfeeding Status: Yes     Procedures: none  Contraception at discharge: none  Denies any ghtn s/sx.  Feels well.  Ready to go home, fob excited to go home.  Just brought in car seat.  Has bp cuff and aware of early follow up.    Pertinent Physical Exam At Time of Discharge  General: Examination reveals a well developed, well nourished, female, in no acute distress. She is alert and cooperative.  Lungs: symmetrical, non-labored breathing.  Cardiac: warm, well-perfused.  Abdomen: soft, non-tender.  Fundus: firm, at umbilicus, and nontender.  Extremities: no redness or tenderness in the calves or thighs.  Neurological: alert, oriented, normal speech, no focal findings or movement disorder noted.     Last Vitals:  Temp Pulse Resp BP MAP Pulse Ox   36.2 °C (97.2 °F) 74 16 98/62 74 98 %     Discharge Meds     Your medication list        START taking these medications        Instructions Last Dose Given Next Dose Due   acetaminophen 325 mg tablet  Commonly known as: Tylenol      Take 3 tablets (975 mg) by mouth every 6 hours.       ibuprofen 600 mg tablet      Take 1 tablet (600 mg) by mouth every 6 hours.              STOP taking these medications      aspirin 81 mg EC tablet        calcium carbonate  mg (750 mg) chewable tablet  Commonly known as: Tums Extra Strength        famotidine 20 mg tablet  Commonly known as: Pepcid        Prenatal Vitamin 27 mg iron- 0.8 mg tablet  Generic drug: prenatal vitamin (iron-folic)                  Where to Get Your Medications        These medications were sent to [id number] - [place], [place] - [address] [address]  [address],  [place] [address]      Hours: 24-hours Phone: [phone]   acetaminophen 325 mg tablet  ibuprofen 600 mg tablet          Complications Requiring Follow-Up  Heavy vaginal bleeding, passing clots, fever and/or chills      Test Results Pending At Discharge  Pending Labs       No current pending labs.            Outpatient Follow-Up  Future Appointments   Date Time Provider Department Center   8/1/2024 11:00 AM [facility] [address] OBGYN NURSE RESOURCE [id number] [facility]   8/1/2024 11:45 AM Lucy Mcgee MD [id number] Academic       I spent 15 minutes in the professional and overall care of this patient.      Keke Weaver, APRN-CNP

## 2024-07-29 NOTE — CARE PLAN
The patient's goals for the shift include   Problem: Postpartum  Goal: Experiences normal postpartum course  Outcome: Met  Goal: Appropriate maternal -  bonding  Outcome: Met     Problem: Safety - Adult  Goal: Free from fall injury  Outcome: Met           VSS, bleeding and swelling minimal, pain controlled with medications, IV to be removed, breastfeeding/pumping.

## 2024-07-30 ENCOUNTER — PATIENT OUTREACH (OUTPATIENT)
Dept: CARE COORDINATION | Facility: CLINIC | Age: 24
End: 2024-07-30
Payer: COMMERCIAL

## 2024-08-01 ENCOUNTER — APPOINTMENT (OUTPATIENT)
Dept: OBSTETRICS AND GYNECOLOGY | Facility: HOSPITAL | Age: 24
End: 2024-08-01
Payer: COMMERCIAL

## 2024-08-29 ENCOUNTER — ROUTINE PRENATAL (OUTPATIENT)
Dept: OBSTETRICS AND GYNECOLOGY | Facility: HOSPITAL | Age: 24
End: 2024-08-29
Payer: COMMERCIAL

## 2024-08-29 VITALS — DIASTOLIC BLOOD PRESSURE: 84 MMHG | SYSTOLIC BLOOD PRESSURE: 122 MMHG | WEIGHT: 286 LBS | BODY MASS INDEX: 46.16 KG/M2

## 2024-08-29 DIAGNOSIS — Z11.3 ROUTINE SCREENING FOR STI (SEXUALLY TRANSMITTED INFECTION): Primary | ICD-10-CM

## 2024-08-29 PROCEDURE — 87661 TRICHOMONAS VAGINALIS AMPLIF: CPT | Performed by: OBSTETRICS & GYNECOLOGY

## 2024-08-29 PROCEDURE — 87491 CHLMYD TRACH DNA AMP PROBE: CPT | Performed by: OBSTETRICS & GYNECOLOGY

## 2024-08-29 RX ORDER — IBUPROFEN 600 MG/1
600 TABLET ORAL EVERY 6 HOURS
Qty: 120 TABLET | Refills: 1 | Status: SHIPPED | OUTPATIENT
Start: 2024-08-29 | End: 2024-10-28

## 2024-08-29 RX ORDER — ACETAMINOPHEN 325 MG/1
975 TABLET ORAL EVERY 6 HOURS
Qty: 90 TABLET | Refills: 0 | Status: SHIPPED | OUTPATIENT
Start: 2024-08-29

## 2024-08-29 SDOH — ECONOMIC STABILITY: FOOD INSECURITY: WITHIN THE PAST 12 MONTHS, THE FOOD YOU BOUGHT JUST DIDN'T LAST AND YOU DIDN'T HAVE MONEY TO GET MORE.: NEVER TRUE

## 2024-08-29 SDOH — ECONOMIC STABILITY: FOOD INSECURITY: WITHIN THE PAST 12 MONTHS, YOU WORRIED THAT YOUR FOOD WOULD RUN OUT BEFORE YOU GOT MONEY TO BUY MORE.: NEVER TRUE

## 2024-08-29 ASSESSMENT — ENCOUNTER SYMPTOMS
PALPITATIONS: 0
OCCASIONAL FEELINGS OF UNSTEADINESS: 0
WEAKNESS: 0
CHILLS: 0
FEVER: 0
HEADACHES: 0
DIZZINESS: 0
DYSURIA: 0
LOSS OF SENSATION IN FEET: 0
DEPRESSION: 0
VOMITING: 0
FREQUENCY: 0
COUGH: 0
DIARRHEA: 0
HEMATURIA: 0
CONSTIPATION: 0
ABDOMINAL PAIN: 0
SHORTNESS OF BREATH: 0
NAUSEA: 0

## 2024-08-29 ASSESSMENT — PATIENT HEALTH QUESTIONNAIRE - PHQ9
2. FEELING DOWN, DEPRESSED OR HOPELESS: NOT AT ALL
SUM OF ALL RESPONSES TO PHQ9 QUESTIONS 1 & 2: 0
1. LITTLE INTEREST OR PLEASURE IN DOING THINGS: NOT AT ALL

## 2024-08-29 NOTE — PROGRESS NOTES
SUBJECTIVE  Sera Mehta is a 24 y.o.  female who presents for a postpartum visit. The delivery was at 37 gestational weeks on .  Patient was admitted to L&D for labor  Outcome: spontaneous vaginal delivery  L&D complications: None      I have fully reviewed the prenatal and intrapartum course.      Antepartum problems:  pregnancy Problems (from 24 to present)       Problem Noted Resolved    Vaginal delivery ([facility]) 2024 by PHAN Dick-CNP No    Priority:  Medium      Heartburn during pregnancy in third trimester ([facility]) 2024 by Lucy Mcgee MD No    Priority:  Medium      Back pain affecting pregnancy in second trimester ([facility]) 2024 by Lucy Mcgee MD No    Priority:  Medium      Overview Signed 2024  1:40 PM by Lucy Mcgee MD     - Pregnancy support belt requested         Encounter for supervision of normal first pregnancy in first trimester ([facility]) 2024 by Lucy Mcgee MD No    Priority:  Medium      Overview Addendum 2024  9:45 AM by Lucy Mcgee MD     [x] Initial BMI: >40  [x] Dating US: 6w US  [x] Prenatal Labs:   [x] Pap: 2022 NILM  [x] Genetic Screenin/9   [x] bASA: Rx   [x] Anatomy US: Complete  [x] 1hr GCT at 24-28wks:  [x] Tdap (27-36wks): 6/3  [] Flu/COVID:   [x] Rhogam (if Rh neg): N/A  [x] GBS at 36 wks: Neg  [x] Breastfeeding: Breast  [x] Postpartum Birth control method: OCP  [] 39 weeks discussion of IOL vs. Expectant management:  [x] Mode of delivery: Anticipate            Obesity complicating pregnancy, third trimester ([facility]) 2024 by Lucy Mcgee MD No    Priority:  Medium      Overview Signed 2024  4:55 PM by Lucy Mcgee MD     - Plan for 34 week  testing and serial growths         Depression during pregnancy in second trimester ([facility]) 2024 by Lucy Mcgee MD No    Priority:  Medium      Overview Signed 2024  6:22 PM by Lucy BANKS  MD Arely     - EPDS 17         Gestational hypertension ([facility]) 2024 by Keke Weaver APRN-CNP No    Vaginal bleeding in pregnancy, second trimester ([facility]) 2024 by Kerri Mullen MD 2024 by Lucy Mcgee MD    Priority:  Medium      30 weeks gestation of pregnancy ([facility]) 2024 by Kerri Mullen MD 2024 by Lucy Mcgee MD    Priority:  Medium      Swelling of lower extremity during pregnancy in second trimester ([facility]) 2024 by Lucy Mcgee MD 2024 by Lucy Mcgee MD    Priority:  Medium      Overview Signed 2024  1:40 PM by Lucy Mcgee MD     - Compression socks requested                   Birth experience: Good  Postpartum course has been good  Baby's course has been good  Baby is feeding by  formula    Bleeding no bleeding  Bowel function is normal  Bladder function is normal  Patient is sexually active  Contraception method is condoms.     OB History          1    Para   1    Term   1            AB        Living   1         SAB        IAB        Ectopic        Multiple   0    Live Births   1               # 1 - Date: 24, Sex: Male, Weight: 3.13 kg, GA: 37w4d, Type: Vaginal, Spontaneous, Apgar1: 9, Apgar5: 9, Living: Living, Birth Comments: None     Immunization History   Administered Date(s) Administered    Tdap vaccine, age 7 year and older (BOOSTRIX, ADACEL) 2024     Postpartum Depression: High Risk (2024)    Stanley  Depression Scale     Last EPDS Total Score: 17     Last EPDS Self Harm Result: Never     Intimate Partner Violence: Not on file     Tobacco Use: Medium Risk (2024)    Patient History     Smoking Tobacco Use: Former     Smokeless Tobacco Use: Never     Passive Exposure: Not on file      Alcohol Use: Not At Risk (2024)    AUDIT-C     Frequency of Alcohol Consumption: Never     Average Number of Drinks: Patient does not drink     Frequency of Binge Drinking: Never       Social History     Substance and Sexual Activity   Drug Use Never           Review of Systems  Review of Systems   Constitutional:  Negative for chills and fever.   Eyes:  Negative for visual disturbance.   Respiratory:  Negative for cough and shortness of breath.    Cardiovascular:  Negative for chest pain and palpitations.   Gastrointestinal:  Negative for abdominal pain, constipation, diarrhea, nausea and vomiting.   Genitourinary:  Negative for dyspareunia, dysuria, frequency, hematuria, urgency, vaginal bleeding and vaginal discharge.   Neurological:  Negative for dizziness, weakness and headaches.         OBJECTIVE  There were no vitals taken for this visit.   There is no height or weight on file to calculate BMI.   Physical Exam  Constitutional:       General: She is not in acute distress.     Appearance: Normal appearance.   Genitourinary:      Vulva and rectum normal.      Right Labia: No skin changes.     Left Labia: No skin changes.     No vaginal discharge.      No cervical discharge or lesion.   HENT:      Head: Normocephalic and atraumatic.      Nose: Nose normal.      Mouth/Throat:      Mouth: Mucous membranes are moist.      Pharynx: Oropharynx is clear.   Eyes:      Extraocular Movements: Extraocular movements intact.      Conjunctiva/sclera: Conjunctivae normal.      Pupils: Pupils are equal, round, and reactive to light.   Cardiovascular:      Rate and Rhythm: Normal rate.      Pulses: Normal pulses.   Pulmonary:      Effort: Pulmonary effort is normal.   Abdominal:      General: Abdomen is flat. There is no distension.      Palpations: Abdomen is soft.      Tenderness: There is no abdominal tenderness. There is no guarding or rebound.   Musculoskeletal:         General: Normal range of motion.   Neurological:      General: No focal deficit present.      Mental Status: She is alert and oriented to person, place, and time.   Skin:     General: Skin is warm and dry.   Psychiatric:         Mood and  Affect: Mood normal.         Behavior: Behavior normal.   Vitals reviewed. Exam conducted with a chaperone present.           Last Pap: approximate date 05/2022 and was normal     ASSESSMENT & PLAN    -Reviewed safe sleep, ABCs, smoke-free environment advised  -Infant feeding support provided  -Discussed contraception - planning condoms  -Screenings for PPD 4  -Cervical cancer screening due next year  -Immunizations rubella immune. Gardasil received   -Pertinent pregnancy complications reviewed and discussed, if applicable     Follow up in 3-6 months for annual     Lucy Mcgee MD  Obstetrics & Gynecology  08/29/24

## 2024-08-30 LAB
C TRACH RRNA SPEC QL NAA+PROBE: NEGATIVE
N GONORRHOEA DNA SPEC QL PROBE+SIG AMP: NEGATIVE
T VAGINALIS RRNA SPEC QL NAA+PROBE: NEGATIVE

## 2025-08-27 ENCOUNTER — APPOINTMENT (OUTPATIENT)
Dept: OBSTETRICS AND GYNECOLOGY | Facility: CLINIC | Age: 25
End: 2025-08-27
Payer: COMMERCIAL

## 2025-08-27 VITALS
BODY MASS INDEX: 49.53 KG/M2 | DIASTOLIC BLOOD PRESSURE: 75 MMHG | HEART RATE: 76 BPM | WEIGHT: 293 LBS | SYSTOLIC BLOOD PRESSURE: 110 MMHG

## 2025-08-27 DIAGNOSIS — Z12.4 SCREENING FOR MALIGNANT NEOPLASM OF CERVIX: ICD-10-CM

## 2025-08-27 DIAGNOSIS — N89.8 VAGINAL IRRITATION: Primary | ICD-10-CM

## 2025-08-27 DIAGNOSIS — B37.2 YEAST DERMATITIS: ICD-10-CM

## 2025-08-27 DIAGNOSIS — Z11.3 ROUTINE SCREENING FOR STI (SEXUALLY TRANSMITTED INFECTION): ICD-10-CM

## 2025-08-27 PROCEDURE — 3078F DIAST BP <80 MM HG: CPT | Performed by: OBSTETRICS & GYNECOLOGY

## 2025-08-27 PROCEDURE — 99395 PREV VISIT EST AGE 18-39: CPT | Performed by: OBSTETRICS & GYNECOLOGY

## 2025-08-27 PROCEDURE — 3074F SYST BP LT 130 MM HG: CPT | Performed by: OBSTETRICS & GYNECOLOGY

## 2025-08-27 RX ORDER — NYSTATIN 100000 [USP'U]/G
1 POWDER TOPICAL 3 TIMES DAILY PRN
Qty: 60 G | Refills: 0 | Status: SHIPPED | OUTPATIENT
Start: 2025-08-27 | End: 2026-08-27

## 2025-08-27 ASSESSMENT — PATIENT HEALTH QUESTIONNAIRE - PHQ9
SUM OF ALL RESPONSES TO PHQ9 QUESTIONS 1 AND 2: 0
2. FEELING DOWN, DEPRESSED OR HOPELESS: NOT AT ALL
1. LITTLE INTEREST OR PLEASURE IN DOING THINGS: NOT AT ALL

## 2025-08-28 LAB — BV SCORE VAG QL: NORMAL
